# Patient Record
Sex: FEMALE | Race: WHITE | NOT HISPANIC OR LATINO
[De-identification: names, ages, dates, MRNs, and addresses within clinical notes are randomized per-mention and may not be internally consistent; named-entity substitution may affect disease eponyms.]

---

## 2017-12-08 ENCOUNTER — RESULT REVIEW (OUTPATIENT)
Age: 67
End: 2017-12-08

## 2018-04-23 PROBLEM — Z00.00 ENCOUNTER FOR PREVENTIVE HEALTH EXAMINATION: Status: ACTIVE | Noted: 2018-04-23

## 2018-08-08 ENCOUNTER — APPOINTMENT (OUTPATIENT)
Dept: OTOLARYNGOLOGY | Facility: CLINIC | Age: 68
End: 2018-08-08
Payer: MEDICARE

## 2018-08-08 VITALS
DIASTOLIC BLOOD PRESSURE: 79 MMHG | SYSTOLIC BLOOD PRESSURE: 121 MMHG | WEIGHT: 145 LBS | BODY MASS INDEX: 26.68 KG/M2 | HEIGHT: 62 IN

## 2018-08-08 DIAGNOSIS — M19.90 UNSPECIFIED OSTEOARTHRITIS, UNSPECIFIED SITE: ICD-10-CM

## 2018-08-08 DIAGNOSIS — I10 ESSENTIAL (PRIMARY) HYPERTENSION: ICD-10-CM

## 2018-08-08 DIAGNOSIS — Z82.2 FAMILY HISTORY OF DEAFNESS AND HEARING LOSS: ICD-10-CM

## 2018-08-08 DIAGNOSIS — H91.93 UNSPECIFIED HEARING LOSS, BILATERAL: ICD-10-CM

## 2018-08-08 DIAGNOSIS — Z87.891 PERSONAL HISTORY OF NICOTINE DEPENDENCE: ICD-10-CM

## 2018-08-08 PROCEDURE — 92567 TYMPANOMETRY: CPT

## 2018-08-08 PROCEDURE — 92557 COMPREHENSIVE HEARING TEST: CPT

## 2018-08-08 PROCEDURE — 99203 OFFICE O/P NEW LOW 30 MIN: CPT

## 2018-08-08 RX ORDER — IRBESARTAN 300 MG/1
TABLET ORAL
Refills: 0 | Status: ACTIVE | COMMUNITY

## 2018-08-28 ENCOUNTER — APPOINTMENT (OUTPATIENT)
Dept: OTOLARYNGOLOGY | Facility: CLINIC | Age: 68
End: 2018-08-28
Payer: MEDICARE

## 2018-08-28 PROCEDURE — 92567 TYMPANOMETRY: CPT

## 2018-08-28 PROCEDURE — 92585: CPT

## 2018-09-06 ENCOUNTER — MESSAGE (OUTPATIENT)
Age: 68
End: 2018-09-06

## 2018-10-16 ENCOUNTER — APPOINTMENT (OUTPATIENT)
Dept: PHARMACY | Facility: CLINIC | Age: 68
End: 2018-10-16
Payer: SELF-PAY

## 2018-10-16 PROCEDURE — V5010 ASSESSMENT FOR HEARING AID: CPT | Mod: NC

## 2018-11-06 ENCOUNTER — OUTPATIENT (OUTPATIENT)
Dept: OUTPATIENT SERVICES | Facility: HOSPITAL | Age: 68
LOS: 1 days | End: 2018-11-06

## 2018-11-06 ENCOUNTER — APPOINTMENT (OUTPATIENT)
Dept: PHARMACY | Facility: CLINIC | Age: 68
End: 2018-11-06

## 2018-11-08 DIAGNOSIS — H90.3 SENSORINEURAL HEARING LOSS, BILATERAL: ICD-10-CM

## 2018-11-28 ENCOUNTER — APPOINTMENT (OUTPATIENT)
Dept: PHARMACY | Facility: CLINIC | Age: 68
End: 2018-11-28
Payer: SELF-PAY

## 2018-11-28 PROCEDURE — V5299A: CUSTOM | Mod: NC

## 2019-03-05 ENCOUNTER — APPOINTMENT (OUTPATIENT)
Dept: PHARMACY | Facility: CLINIC | Age: 69
End: 2019-03-05
Payer: SELF-PAY

## 2019-03-05 PROCEDURE — V5299A: CUSTOM | Mod: NC

## 2019-06-05 ENCOUNTER — APPOINTMENT (OUTPATIENT)
Dept: PHARMACY | Facility: CLINIC | Age: 69
End: 2019-06-05
Payer: SELF-PAY

## 2019-06-05 PROCEDURE — V5265A: CUSTOM

## 2019-06-05 PROCEDURE — V5267D: CUSTOM

## 2019-08-07 ENCOUNTER — APPOINTMENT (OUTPATIENT)
Dept: OTOLARYNGOLOGY | Facility: CLINIC | Age: 69
End: 2019-08-07
Payer: MEDICARE

## 2019-08-07 VITALS — BODY MASS INDEX: 27.05 KG/M2 | WEIGHT: 147 LBS | HEIGHT: 62 IN

## 2019-08-07 DIAGNOSIS — H61.23 IMPACTED CERUMEN, BILATERAL: ICD-10-CM

## 2019-08-07 PROCEDURE — 69210 REMOVE IMPACTED EAR WAX UNI: CPT

## 2019-08-07 PROCEDURE — 99213 OFFICE O/P EST LOW 20 MIN: CPT | Mod: 25

## 2019-08-07 PROCEDURE — 92557 COMPREHENSIVE HEARING TEST: CPT

## 2019-08-07 PROCEDURE — 92567 TYMPANOMETRY: CPT

## 2019-08-07 RX ORDER — PSYLLIUM HUSK 0.4 G
CAPSULE ORAL
Refills: 0 | Status: DISCONTINUED | COMMUNITY
End: 2019-08-07

## 2019-08-09 PROBLEM — H61.23 BILATERAL IMPACTED CERUMEN: Status: ACTIVE | Noted: 2019-08-09

## 2019-08-09 NOTE — HISTORY OF PRESENT ILLNESS
[de-identified] : 69F here for f/u asymmetric left SNHL. Pt feels hearing is stable- has b/l hearing hearing aids- wears them consistently. Pt denies otalgia, otorrhea, ear infections, tinnitus, dizziness, vertigo or headaches related to hearing.

## 2019-08-09 NOTE — PHYSICAL EXAM
[Midline] : trachea located in midline position [Normal] : no rashes [de-identified] : wax AS removed under microscope - EAC upslopes

## 2019-08-09 NOTE — REASON FOR VISIT
[Subsequent Evaluation] : a subsequent evaluation for [FreeTextEntry2] : f/u for asymmetric left SNHL

## 2019-10-21 ENCOUNTER — APPOINTMENT (OUTPATIENT)
Dept: PHARMACY | Facility: CLINIC | Age: 69
End: 2019-10-21
Payer: SELF-PAY

## 2019-10-21 PROCEDURE — V5299A: CUSTOM | Mod: NC

## 2019-11-27 ENCOUNTER — APPOINTMENT (OUTPATIENT)
Dept: PHARMACY | Facility: CLINIC | Age: 69
End: 2019-11-27
Payer: SELF-PAY

## 2019-11-27 PROCEDURE — V5299A: CUSTOM | Mod: NC

## 2020-09-23 ENCOUNTER — RESULT REVIEW (OUTPATIENT)
Age: 70
End: 2020-09-23

## 2020-09-24 ENCOUNTER — APPOINTMENT (OUTPATIENT)
Dept: PHARMACY | Facility: CLINIC | Age: 70
End: 2020-09-24
Payer: SELF-PAY

## 2020-09-24 PROCEDURE — V5266C: CUSTOM

## 2020-11-30 ENCOUNTER — APPOINTMENT (OUTPATIENT)
Dept: OTOLARYNGOLOGY | Facility: CLINIC | Age: 70
End: 2020-11-30
Payer: MEDICARE

## 2020-11-30 ENCOUNTER — APPOINTMENT (OUTPATIENT)
Dept: PHARMACY | Facility: CLINIC | Age: 70
End: 2020-11-30
Payer: SELF-PAY

## 2020-11-30 VITALS
DIASTOLIC BLOOD PRESSURE: 83 MMHG | BODY MASS INDEX: 27.6 KG/M2 | SYSTOLIC BLOOD PRESSURE: 137 MMHG | WEIGHT: 150 LBS | HEART RATE: 73 BPM | HEIGHT: 62 IN

## 2020-11-30 PROCEDURE — V5265A: CUSTOM

## 2020-11-30 PROCEDURE — 99213 OFFICE O/P EST LOW 20 MIN: CPT

## 2020-11-30 PROCEDURE — V5267D: CUSTOM

## 2020-11-30 RX ORDER — DILTIAZEM HYDROCHLORIDE 120 MG/1
120 CAPSULE, EXTENDED RELEASE ORAL
Qty: 30 | Refills: 0 | Status: DISCONTINUED | COMMUNITY
Start: 2020-08-12

## 2020-11-30 RX ORDER — ELUXADOLINE 75 MG/1
TABLET, FILM COATED ORAL
Refills: 0 | Status: DISCONTINUED | COMMUNITY
End: 2020-11-30

## 2020-11-30 RX ORDER — MUPIROCIN 20 MG/G
2 OINTMENT TOPICAL
Qty: 22 | Refills: 0 | Status: DISCONTINUED | COMMUNITY
Start: 2020-10-26

## 2020-11-30 RX ORDER — DOXYCYCLINE HYCLATE 100 MG/1
100 CAPSULE ORAL
Qty: 2 | Refills: 0 | Status: DISCONTINUED | COMMUNITY
Start: 2020-10-26

## 2020-12-01 NOTE — PHYSICAL EXAM
[Midline] : trachea located in midline position [Normal] : no rashes [de-identified] : tm NORMAL au- EAC upslopes

## 2020-12-01 NOTE — REASON FOR VISIT
[Subsequent Evaluation] : a subsequent evaluation for [FreeTextEntry2] : follow up bilateral hearing loss, left worse than the right

## 2020-12-01 NOTE — DATA REVIEWED
[de-identified] : Mild sensorineural hearing loss with the exception of hearing within normal limits 250Hz-1kHz, AD, and 250Hz-500Hz, AS.

## 2020-12-01 NOTE — HISTORY OF PRESENT ILLNESS
[de-identified] : 70 year old female follow up bilateral hearing loss, left worse than the right.  States has bilateral hearing aids but hasn't been wearing them for months due to the mask, when taking off the mask, the hearing aids came out.   States feels no change in hearing since last visit Aug, 2019. No tinnitus or vertigo -

## 2021-02-11 ENCOUNTER — APPOINTMENT (OUTPATIENT)
Dept: PHARMACY | Facility: CLINIC | Age: 71
End: 2021-02-11
Payer: SELF-PAY

## 2021-02-11 PROCEDURE — V5299A: CUSTOM | Mod: NC

## 2021-09-27 ENCOUNTER — APPOINTMENT (OUTPATIENT)
Dept: PHARMACY | Facility: CLINIC | Age: 71
End: 2021-09-27
Payer: SELF-PAY

## 2021-09-27 PROCEDURE — V5299A: CUSTOM | Mod: NC

## 2021-11-29 ENCOUNTER — APPOINTMENT (OUTPATIENT)
Dept: OTOLARYNGOLOGY | Facility: CLINIC | Age: 71
End: 2021-11-29

## 2022-01-19 ENCOUNTER — APPOINTMENT (OUTPATIENT)
Dept: OTOLARYNGOLOGY | Facility: CLINIC | Age: 72
End: 2022-01-19
Payer: MEDICARE

## 2022-01-19 VITALS — BODY MASS INDEX: 27.23 KG/M2 | WEIGHT: 148 LBS | HEIGHT: 62 IN

## 2022-01-19 VITALS — DIASTOLIC BLOOD PRESSURE: 85 MMHG | SYSTOLIC BLOOD PRESSURE: 155 MMHG | HEART RATE: 75 BPM

## 2022-01-19 DIAGNOSIS — H90.5 UNSPECIFIED SENSORINEURAL HEARING LOSS: ICD-10-CM

## 2022-01-19 PROCEDURE — 92567 TYMPANOMETRY: CPT

## 2022-01-19 PROCEDURE — 92557 COMPREHENSIVE HEARING TEST: CPT

## 2022-01-19 PROCEDURE — 99213 OFFICE O/P EST LOW 20 MIN: CPT

## 2022-02-05 PROBLEM — H90.5 ASYMMETRICAL SENSORINEURAL HEARING LOSS: Status: ACTIVE | Noted: 2022-02-05

## 2022-02-05 NOTE — PHYSICAL EXAM
[Midline] : trachea located in midline position [Normal] : no rashes [de-identified] : tm NORMAL au- EAC upslopes

## 2022-02-05 NOTE — DATA REVIEWED
[de-identified] : Right ear: Hearing is WNL from 250Hz-1kHz with an essentially mild SNHL thereafter\par Left ear: Essentially mild to moderate SNHL from 250Hz-8kHz\par Type A tymps Au

## 2022-02-05 NOTE — HISTORY OF PRESENT ILLNESS
[de-identified] : 71 year old female follow up bilateral hearing loss, left worse than the right.  has bilateral hearing aids but hasn't been wearing them due to the mask, when taking off the mask, the hearing aids came out.  feels no change in hearing since last visit Nov 30, 2020.  has bilateral tinnitus, if she thinks about it, it's there, if she doesn't think about it, she doesn't notice it.

## 2022-03-22 ENCOUNTER — RESULT REVIEW (OUTPATIENT)
Age: 72
End: 2022-03-22

## 2022-08-25 ENCOUNTER — APPOINTMENT (OUTPATIENT)
Dept: PHARMACY | Facility: CLINIC | Age: 72
End: 2022-08-25

## 2022-08-25 PROCEDURE — V5014I: CUSTOM

## 2023-01-25 ENCOUNTER — APPOINTMENT (OUTPATIENT)
Dept: OTOLARYNGOLOGY | Facility: CLINIC | Age: 73
End: 2023-01-25

## 2023-03-29 ENCOUNTER — APPOINTMENT (OUTPATIENT)
Dept: OTOLARYNGOLOGY | Facility: CLINIC | Age: 73
End: 2023-03-29

## 2023-05-27 ENCOUNTER — INPATIENT (INPATIENT)
Facility: HOSPITAL | Age: 73
LOS: 4 days | Discharge: HOME | End: 2023-06-01
Attending: STUDENT IN AN ORGANIZED HEALTH CARE EDUCATION/TRAINING PROGRAM | Admitting: STUDENT IN AN ORGANIZED HEALTH CARE EDUCATION/TRAINING PROGRAM
Payer: MEDICARE

## 2023-05-27 VITALS
OXYGEN SATURATION: 99 % | DIASTOLIC BLOOD PRESSURE: 82 MMHG | SYSTOLIC BLOOD PRESSURE: 126 MMHG | TEMPERATURE: 97 F | HEART RATE: 110 BPM | RESPIRATION RATE: 21 BRPM

## 2023-05-27 PROCEDURE — 99285 EMERGENCY DEPT VISIT HI MDM: CPT | Mod: GC

## 2023-05-27 RX ORDER — FOLIC ACID 0.8 MG
1 TABLET ORAL ONCE
Refills: 0 | Status: COMPLETED | OUTPATIENT
Start: 2023-05-27 | End: 2023-05-27

## 2023-05-27 RX ORDER — THIAMINE MONONITRATE (VIT B1) 100 MG
500 TABLET ORAL ONCE
Refills: 0 | Status: COMPLETED | OUTPATIENT
Start: 2023-05-27 | End: 2023-05-27

## 2023-05-27 RX ORDER — SODIUM CHLORIDE 9 MG/ML
1000 INJECTION INTRAMUSCULAR; INTRAVENOUS; SUBCUTANEOUS ONCE
Refills: 0 | Status: COMPLETED | OUTPATIENT
Start: 2023-05-27 | End: 2023-05-27

## 2023-05-27 NOTE — ED ADULT TRIAGE NOTE - CHIEF COMPLAINT QUOTE
worsening unsteady gait x 2 weeks with 2 falls today and increased anxiety related to inability to safely ambulate. PT denies any LOC or head trauma during falls and falls have been unwitnessed. PT also reports numbness to left foot. Hx of anxiety, HTN,

## 2023-05-28 DIAGNOSIS — E87.1 HYPO-OSMOLALITY AND HYPONATREMIA: ICD-10-CM

## 2023-05-28 DIAGNOSIS — R74.01 ELEVATION OF LEVELS OF LIVER TRANSAMINASE LEVELS: ICD-10-CM
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
## 2023-05-28 DIAGNOSIS — F10.239 ALCOHOL DEPENDENCE WITH WITHDRAWAL, UNSPECIFIED: ICD-10-CM

## 2023-05-28 DIAGNOSIS — Z29.9 ENCOUNTER FOR PROPHYLACTIC MEASURES, UNSPECIFIED: ICD-10-CM

## 2023-05-28 DIAGNOSIS — I10 ESSENTIAL (PRIMARY) HYPERTENSION: ICD-10-CM

## 2023-05-28 DIAGNOSIS — R26.81 UNSTEADINESS ON FEET: ICD-10-CM

## 2023-05-28 DIAGNOSIS — F41.9 ANXIETY DISORDER, UNSPECIFIED: ICD-10-CM

## 2023-05-28 DIAGNOSIS — D64.9 ANEMIA, UNSPECIFIED: ICD-10-CM

## 2023-05-28 LAB
ALBUMIN SERPL ELPH-MCNC: 3.9 G/DL — SIGNIFICANT CHANGE UP (ref 3.3–5)
ALBUMIN SERPL ELPH-MCNC: 4 G/DL — SIGNIFICANT CHANGE UP (ref 3.3–5)
ALBUMIN SERPL ELPH-MCNC: 4.5 G/DL — SIGNIFICANT CHANGE UP (ref 3.3–5)
ALP SERPL-CCNC: 142 U/L — HIGH (ref 40–120)
ALP SERPL-CCNC: 147 U/L — HIGH (ref 40–120)
ALP SERPL-CCNC: 167 U/L — HIGH (ref 40–120)
ALT FLD-CCNC: 117 U/L — HIGH (ref 4–33)
ALT FLD-CCNC: 97 U/L — HIGH (ref 4–33)
ALT FLD-CCNC: 97 U/L — HIGH (ref 4–33)
ANION GAP SERPL CALC-SCNC: 13 MMOL/L — SIGNIFICANT CHANGE UP (ref 7–14)
ANION GAP SERPL CALC-SCNC: 15 MMOL/L — HIGH (ref 7–14)
ANION GAP SERPL CALC-SCNC: 15 MMOL/L — HIGH (ref 7–14)
ANION GAP SERPL CALC-SCNC: 17 MMOL/L — HIGH (ref 7–14)
ANION GAP SERPL CALC-SCNC: 20 MMOL/L — HIGH (ref 7–14)
APPEARANCE UR: CLEAR — SIGNIFICANT CHANGE UP
APTT BLD: 26.8 SEC — LOW (ref 27–36.3)
AST SERPL-CCNC: 116 U/L — HIGH (ref 4–32)
AST SERPL-CCNC: 118 U/L — HIGH (ref 4–32)
AST SERPL-CCNC: 147 U/L — HIGH (ref 4–32)
BACTERIA # UR AUTO: ABNORMAL
BASE EXCESS BLDV CALC-SCNC: 1.6 MMOL/L — SIGNIFICANT CHANGE UP (ref -2–3)
BASOPHILS # BLD AUTO: 0.04 K/UL — SIGNIFICANT CHANGE UP (ref 0–0.2)
BASOPHILS NFR BLD AUTO: 0.6 % — SIGNIFICANT CHANGE UP (ref 0–2)
BILIRUB DIRECT SERPL-MCNC: 0.8 MG/DL — HIGH (ref 0–0.3)
BILIRUB DIRECT SERPL-MCNC: 0.8 MG/DL — HIGH (ref 0–0.3)
BILIRUB INDIRECT FLD-MCNC: 1 MG/DL — SIGNIFICANT CHANGE UP (ref 0–1)
BILIRUB INDIRECT FLD-MCNC: 1 MG/DL — SIGNIFICANT CHANGE UP (ref 0–1)
BILIRUB SERPL-MCNC: 1.8 MG/DL — HIGH (ref 0.2–1.2)
BILIRUB SERPL-MCNC: 1.8 MG/DL — HIGH (ref 0.2–1.2)
BILIRUB SERPL-MCNC: 2.5 MG/DL — HIGH (ref 0.2–1.2)
BILIRUB UR-MCNC: NEGATIVE — SIGNIFICANT CHANGE UP
BUN SERPL-MCNC: 10 MG/DL — SIGNIFICANT CHANGE UP (ref 7–23)
BUN SERPL-MCNC: 7 MG/DL — SIGNIFICANT CHANGE UP (ref 7–23)
BUN SERPL-MCNC: 7 MG/DL — SIGNIFICANT CHANGE UP (ref 7–23)
BUN SERPL-MCNC: 8 MG/DL — SIGNIFICANT CHANGE UP (ref 7–23)
BUN SERPL-MCNC: 8 MG/DL — SIGNIFICANT CHANGE UP (ref 7–23)
CA-I SERPL-SCNC: 1.16 MMOL/L — SIGNIFICANT CHANGE UP (ref 1.15–1.33)
CALCIUM SERPL-MCNC: 8.3 MG/DL — LOW (ref 8.4–10.5)
CALCIUM SERPL-MCNC: 8.4 MG/DL — SIGNIFICANT CHANGE UP (ref 8.4–10.5)
CALCIUM SERPL-MCNC: 8.8 MG/DL — SIGNIFICANT CHANGE UP (ref 8.4–10.5)
CALCIUM SERPL-MCNC: 8.8 MG/DL — SIGNIFICANT CHANGE UP (ref 8.4–10.5)
CALCIUM SERPL-MCNC: 9.3 MG/DL — SIGNIFICANT CHANGE UP (ref 8.4–10.5)
CHLORIDE BLDV-SCNC: 87 MMOL/L — LOW (ref 96–108)
CHLORIDE SERPL-SCNC: 81 MMOL/L — LOW (ref 98–107)
CHLORIDE SERPL-SCNC: 85 MMOL/L — LOW (ref 98–107)
CHLORIDE SERPL-SCNC: 85 MMOL/L — LOW (ref 98–107)
CHLORIDE SERPL-SCNC: 86 MMOL/L — LOW (ref 98–107)
CHLORIDE SERPL-SCNC: 87 MMOL/L — LOW (ref 98–107)
CO2 BLDV-SCNC: 27 MMOL/L — HIGH (ref 22–26)
CO2 SERPL-SCNC: 21 MMOL/L — LOW (ref 22–31)
CO2 SERPL-SCNC: 22 MMOL/L — SIGNIFICANT CHANGE UP (ref 22–31)
COLOR SPEC: SIGNIFICANT CHANGE UP
CREAT SERPL-MCNC: 0.49 MG/DL — LOW (ref 0.5–1.3)
CREAT SERPL-MCNC: 0.51 MG/DL — SIGNIFICANT CHANGE UP (ref 0.5–1.3)
CREAT SERPL-MCNC: 0.52 MG/DL — SIGNIFICANT CHANGE UP (ref 0.5–1.3)
CREAT SERPL-MCNC: 0.55 MG/DL — SIGNIFICANT CHANGE UP (ref 0.5–1.3)
CREAT SERPL-MCNC: 0.59 MG/DL — SIGNIFICANT CHANGE UP (ref 0.5–1.3)
CRP SERPL-MCNC: 4.8 MG/L — SIGNIFICANT CHANGE UP
DIFF PNL FLD: NEGATIVE — SIGNIFICANT CHANGE UP
EGFR: 100 ML/MIN/1.73M2 — SIGNIFICANT CHANGE UP
EGFR: 100 ML/MIN/1.73M2 — SIGNIFICANT CHANGE UP
EGFR: 101 ML/MIN/1.73M2 — SIGNIFICANT CHANGE UP
EGFR: 97 ML/MIN/1.73M2 — SIGNIFICANT CHANGE UP
EGFR: 99 ML/MIN/1.73M2 — SIGNIFICANT CHANGE UP
EOSINOPHIL # BLD AUTO: 0.02 K/UL — SIGNIFICANT CHANGE UP (ref 0–0.5)
EOSINOPHIL NFR BLD AUTO: 0.3 % — SIGNIFICANT CHANGE UP (ref 0–6)
EPI CELLS # UR: 1 /HPF — SIGNIFICANT CHANGE UP (ref 0–5)
FERRITIN SERPL-MCNC: 1249 NG/ML — HIGH (ref 15–150)
FOLATE SERPL-MCNC: >20 NG/ML — SIGNIFICANT CHANGE UP (ref 3.1–17.5)
GAS PNL BLDV: 121 MMOL/L — LOW (ref 136–145)
GAS PNL BLDV: SIGNIFICANT CHANGE UP
GLUCOSE BLDV-MCNC: 104 MG/DL — HIGH (ref 70–99)
GLUCOSE SERPL-MCNC: 104 MG/DL — HIGH (ref 70–99)
GLUCOSE SERPL-MCNC: 106 MG/DL — HIGH (ref 70–99)
GLUCOSE SERPL-MCNC: 111 MG/DL — HIGH (ref 70–99)
GLUCOSE SERPL-MCNC: 137 MG/DL — HIGH (ref 70–99)
GLUCOSE SERPL-MCNC: 95 MG/DL — SIGNIFICANT CHANGE UP (ref 70–99)
GLUCOSE UR QL: NEGATIVE — SIGNIFICANT CHANGE UP
HCO3 BLDV-SCNC: 26 MMOL/L — SIGNIFICANT CHANGE UP (ref 22–29)
HCT VFR BLD CALC: 29.5 % — LOW (ref 34.5–45)
HCT VFR BLD CALC: 31.1 % — LOW (ref 34.5–45)
HCT VFR BLDA CALC: 32 % — LOW (ref 34.5–46.5)
HGB BLD CALC-MCNC: 10.8 G/DL — LOW (ref 11.7–16.1)
HGB BLD-MCNC: 10.4 G/DL — LOW (ref 11.5–15.5)
HGB BLD-MCNC: 10.9 G/DL — LOW (ref 11.5–15.5)
IANC: 4.58 K/UL — SIGNIFICANT CHANGE UP (ref 1.8–7.4)
IMM GRANULOCYTES NFR BLD AUTO: 0.6 % — SIGNIFICANT CHANGE UP (ref 0–0.9)
INR BLD: 1.07 RATIO — SIGNIFICANT CHANGE UP (ref 0.88–1.16)
IRON SATN MFR SERPL: 108 UG/DL — SIGNIFICANT CHANGE UP (ref 30–160)
IRON SATN MFR SERPL: 50 % — SIGNIFICANT CHANGE UP (ref 14–50)
KETONES UR-MCNC: ABNORMAL
LACTATE BLDV-MCNC: 0.9 MMOL/L — SIGNIFICANT CHANGE UP (ref 0.5–2)
LEUKOCYTE ESTERASE UR-ACNC: ABNORMAL
LYMPHOCYTES # BLD AUTO: 0.8 K/UL — LOW (ref 1–3.3)
LYMPHOCYTES # BLD AUTO: 12.9 % — LOW (ref 13–44)
MAGNESIUM SERPL-MCNC: 1.2 MG/DL — LOW (ref 1.6–2.6)
MAGNESIUM SERPL-MCNC: 1.2 MG/DL — LOW (ref 1.6–2.6)
MAGNESIUM SERPL-MCNC: 2 MG/DL — SIGNIFICANT CHANGE UP (ref 1.6–2.6)
MCHC RBC-ENTMCNC: 33.3 PG — SIGNIFICANT CHANGE UP (ref 27–34)
MCHC RBC-ENTMCNC: 34.8 PG — HIGH (ref 27–34)
MCHC RBC-ENTMCNC: 35 GM/DL — SIGNIFICANT CHANGE UP (ref 32–36)
MCHC RBC-ENTMCNC: 35.3 GM/DL — SIGNIFICANT CHANGE UP (ref 32–36)
MCV RBC AUTO: 95.1 FL — SIGNIFICANT CHANGE UP (ref 80–100)
MCV RBC AUTO: 98.7 FL — SIGNIFICANT CHANGE UP (ref 80–100)
MONOCYTES # BLD AUTO: 0.7 K/UL — SIGNIFICANT CHANGE UP (ref 0–0.9)
MONOCYTES NFR BLD AUTO: 11.3 % — SIGNIFICANT CHANGE UP (ref 2–14)
NEUTROPHILS # BLD AUTO: 4.58 K/UL — SIGNIFICANT CHANGE UP (ref 1.8–7.4)
NEUTROPHILS NFR BLD AUTO: 74.3 % — SIGNIFICANT CHANGE UP (ref 43–77)
NITRITE UR-MCNC: NEGATIVE — SIGNIFICANT CHANGE UP
NRBC # BLD: 0 /100 WBCS — SIGNIFICANT CHANGE UP (ref 0–0)
NRBC # BLD: 0 /100 WBCS — SIGNIFICANT CHANGE UP (ref 0–0)
NRBC # FLD: 0 K/UL — SIGNIFICANT CHANGE UP (ref 0–0)
NRBC # FLD: 0 K/UL — SIGNIFICANT CHANGE UP (ref 0–0)
OSMOLALITY SERPL: 258 MOSM/KG — LOW (ref 275–295)
OSMOLALITY UR: 228 MOSM/KG — SIGNIFICANT CHANGE UP (ref 50–1200)
PCO2 BLDV: 38 MMHG — LOW (ref 39–52)
PH BLDV: 7.44 — HIGH (ref 7.32–7.43)
PH UR: 6.5 — SIGNIFICANT CHANGE UP (ref 5–8)
PHOSPHATE SERPL-MCNC: 2.7 MG/DL — SIGNIFICANT CHANGE UP (ref 2.5–4.5)
PHOSPHATE SERPL-MCNC: 2.8 MG/DL — SIGNIFICANT CHANGE UP (ref 2.5–4.5)
PHOSPHATE SERPL-MCNC: 2.9 MG/DL — SIGNIFICANT CHANGE UP (ref 2.5–4.5)
PLATELET # BLD AUTO: 104 K/UL — LOW (ref 150–400)
PLATELET # BLD AUTO: 117 K/UL — LOW (ref 150–400)
PO2 BLDV: 37 MMHG — SIGNIFICANT CHANGE UP (ref 25–45)
POTASSIUM BLDV-SCNC: 3.1 MMOL/L — LOW (ref 3.5–5.1)
POTASSIUM SERPL-MCNC: 3.1 MMOL/L — LOW (ref 3.5–5.3)
POTASSIUM SERPL-MCNC: 3.2 MMOL/L — LOW (ref 3.5–5.3)
POTASSIUM SERPL-MCNC: 3.3 MMOL/L — LOW (ref 3.5–5.3)
POTASSIUM SERPL-MCNC: 3.4 MMOL/L — LOW (ref 3.5–5.3)
POTASSIUM SERPL-MCNC: 3.5 MMOL/L — SIGNIFICANT CHANGE UP (ref 3.5–5.3)
POTASSIUM SERPL-SCNC: 3.1 MMOL/L — LOW (ref 3.5–5.3)
POTASSIUM SERPL-SCNC: 3.2 MMOL/L — LOW (ref 3.5–5.3)
POTASSIUM SERPL-SCNC: 3.3 MMOL/L — LOW (ref 3.5–5.3)
POTASSIUM SERPL-SCNC: 3.4 MMOL/L — LOW (ref 3.5–5.3)
POTASSIUM SERPL-SCNC: 3.5 MMOL/L — SIGNIFICANT CHANGE UP (ref 3.5–5.3)
PROCALCITONIN SERPL-MCNC: 0.25 NG/ML — HIGH (ref 0.02–0.1)
PROT SERPL-MCNC: 6.2 G/DL — SIGNIFICANT CHANGE UP (ref 6–8.3)
PROT SERPL-MCNC: 6.5 G/DL — SIGNIFICANT CHANGE UP (ref 6–8.3)
PROT SERPL-MCNC: 7.1 G/DL — SIGNIFICANT CHANGE UP (ref 6–8.3)
PROT UR-MCNC: ABNORMAL
PROTHROM AB SERPL-ACNC: 12.4 SEC — SIGNIFICANT CHANGE UP (ref 10.5–13.4)
RBC # BLD: 2.99 M/UL — LOW (ref 3.8–5.2)
RBC # BLD: 3.27 M/UL — LOW (ref 3.8–5.2)
RBC # FLD: 13 % — SIGNIFICANT CHANGE UP (ref 10.3–14.5)
RBC # FLD: 13.1 % — SIGNIFICANT CHANGE UP (ref 10.3–14.5)
RBC CASTS # UR COMP ASSIST: 1 /HPF — SIGNIFICANT CHANGE UP (ref 0–4)
SAO2 % BLDV: 65.3 % — LOW (ref 67–88)
SODIUM SERPL-SCNC: 122 MMOL/L — LOW (ref 135–145)
SODIUM SERPL-SCNC: 125 MMOL/L — LOW (ref 135–145)
SP GR SPEC: 1.01 — LOW (ref 1.01–1.05)
T4 AB SER-ACNC: 7.99 UG/DL — SIGNIFICANT CHANGE UP (ref 5.1–13)
TIBC SERPL-MCNC: 218 UG/DL — LOW (ref 220–430)
TSH SERPL-MCNC: 2.5 UIU/ML — SIGNIFICANT CHANGE UP (ref 0.27–4.2)
TSH SERPL-MCNC: 2.66 UIU/ML — SIGNIFICANT CHANGE UP (ref 0.27–4.2)
UIBC SERPL-MCNC: 110 UG/DL — SIGNIFICANT CHANGE UP (ref 110–370)
UROBILINOGEN FLD QL: SIGNIFICANT CHANGE UP
VIT B12 SERPL-MCNC: 1049 PG/ML — HIGH (ref 200–900)
WBC # BLD: 4.03 K/UL — SIGNIFICANT CHANGE UP (ref 3.8–10.5)
WBC # BLD: 6.18 K/UL — SIGNIFICANT CHANGE UP (ref 3.8–10.5)
WBC # FLD AUTO: 4.03 K/UL — SIGNIFICANT CHANGE UP (ref 3.8–10.5)
WBC # FLD AUTO: 6.18 K/UL — SIGNIFICANT CHANGE UP (ref 3.8–10.5)
WBC UR QL: 15 /HPF — HIGH (ref 0–5)

## 2023-05-28 PROCEDURE — 12345: CPT | Mod: NC,GC

## 2023-05-28 PROCEDURE — 76705 ECHO EXAM OF ABDOMEN: CPT | Mod: 26

## 2023-05-28 PROCEDURE — 99497 ADVNCD CARE PLAN 30 MIN: CPT | Mod: 25

## 2023-05-28 PROCEDURE — 99223 1ST HOSP IP/OBS HIGH 75: CPT

## 2023-05-28 PROCEDURE — 73564 X-RAY EXAM KNEE 4 OR MORE: CPT | Mod: 26,LT

## 2023-05-28 PROCEDURE — 71045 X-RAY EXAM CHEST 1 VIEW: CPT | Mod: 26

## 2023-05-28 PROCEDURE — 70450 CT HEAD/BRAIN W/O DYE: CPT | Mod: 26,MA

## 2023-05-28 RX ORDER — MAGNESIUM SULFATE 500 MG/ML
2 VIAL (ML) INJECTION EVERY 4 HOURS
Refills: 0 | Status: COMPLETED | OUTPATIENT
Start: 2023-05-28 | End: 2023-05-28

## 2023-05-28 RX ORDER — ACETAMINOPHEN 500 MG
650 TABLET ORAL ONCE
Refills: 0 | Status: COMPLETED | OUTPATIENT
Start: 2023-05-28 | End: 2023-05-28

## 2023-05-28 RX ORDER — POTASSIUM CHLORIDE 20 MEQ
40 PACKET (EA) ORAL EVERY 4 HOURS
Refills: 0 | Status: DISCONTINUED | OUTPATIENT
Start: 2023-05-28 | End: 2023-05-28

## 2023-05-28 RX ORDER — THIAMINE MONONITRATE (VIT B1) 100 MG
500 TABLET ORAL EVERY 8 HOURS
Refills: 0 | Status: DISCONTINUED | OUTPATIENT
Start: 2023-05-28 | End: 2023-06-01

## 2023-05-28 RX ORDER — POTASSIUM CHLORIDE 20 MEQ
10 PACKET (EA) ORAL
Refills: 0 | Status: COMPLETED | OUTPATIENT
Start: 2023-05-28 | End: 2023-05-28

## 2023-05-28 RX ORDER — ENOXAPARIN SODIUM 100 MG/ML
40 INJECTION SUBCUTANEOUS EVERY 24 HOURS
Refills: 0 | Status: DISCONTINUED | OUTPATIENT
Start: 2023-05-28 | End: 2023-06-01

## 2023-05-28 RX ORDER — LOSARTAN POTASSIUM 100 MG/1
50 TABLET, FILM COATED ORAL DAILY
Refills: 0 | Status: DISCONTINUED | OUTPATIENT
Start: 2023-05-28 | End: 2023-06-01

## 2023-05-28 RX ORDER — MAGNESIUM SULFATE 500 MG/ML
2 VIAL (ML) INJECTION ONCE
Refills: 0 | Status: COMPLETED | OUTPATIENT
Start: 2023-05-28 | End: 2023-05-28

## 2023-05-28 RX ORDER — SODIUM CHLORIDE 9 MG/ML
1000 INJECTION INTRAMUSCULAR; INTRAVENOUS; SUBCUTANEOUS
Refills: 0 | Status: DISCONTINUED | OUTPATIENT
Start: 2023-05-28 | End: 2023-05-28

## 2023-05-28 RX ORDER — MAGNESIUM SULFATE 500 MG/ML
1 VIAL (ML) INJECTION ONCE
Refills: 0 | Status: DISCONTINUED | OUTPATIENT
Start: 2023-05-28 | End: 2023-05-28

## 2023-05-28 RX ORDER — SODIUM CHLORIDE 9 MG/ML
1000 INJECTION INTRAMUSCULAR; INTRAVENOUS; SUBCUTANEOUS
Refills: 0 | Status: DISCONTINUED | OUTPATIENT
Start: 2023-05-28 | End: 2023-05-29

## 2023-05-28 RX ORDER — FOLIC ACID 0.8 MG
1 TABLET ORAL DAILY
Refills: 0 | Status: DISCONTINUED | OUTPATIENT
Start: 2023-05-28 | End: 2023-05-31

## 2023-05-28 RX ADMIN — SODIUM CHLORIDE 75 MILLILITER(S): 9 INJECTION INTRAMUSCULAR; INTRAVENOUS; SUBCUTANEOUS at 20:22

## 2023-05-28 RX ADMIN — Medication 105 MILLIGRAM(S): at 08:43

## 2023-05-28 RX ADMIN — Medication 105 MILLIGRAM(S): at 16:39

## 2023-05-28 RX ADMIN — Medication 1 MILLIGRAM(S): at 00:40

## 2023-05-28 RX ADMIN — SODIUM CHLORIDE 100 MILLILITER(S): 9 INJECTION INTRAMUSCULAR; INTRAVENOUS; SUBCUTANEOUS at 22:33

## 2023-05-28 RX ADMIN — Medication 105 MILLIGRAM(S): at 22:34

## 2023-05-28 RX ADMIN — Medication 1 MILLIGRAM(S): at 11:57

## 2023-05-28 RX ADMIN — Medication 105 MILLIGRAM(S): at 00:40

## 2023-05-28 RX ADMIN — Medication 40 MILLIEQUIVALENT(S): at 10:22

## 2023-05-28 RX ADMIN — Medication 25 GRAM(S): at 10:24

## 2023-05-28 RX ADMIN — Medication 25 GRAM(S): at 04:00

## 2023-05-28 RX ADMIN — SODIUM CHLORIDE 1000 MILLILITER(S): 9 INJECTION INTRAMUSCULAR; INTRAVENOUS; SUBCUTANEOUS at 00:39

## 2023-05-28 RX ADMIN — Medication 25 GRAM(S): at 07:17

## 2023-05-28 RX ADMIN — Medication 2 MILLIGRAM(S): at 00:40

## 2023-05-28 RX ADMIN — Medication 1 TABLET(S): at 11:57

## 2023-05-28 RX ADMIN — LOSARTAN POTASSIUM 50 MILLIGRAM(S): 100 TABLET, FILM COATED ORAL at 07:16

## 2023-05-28 RX ADMIN — Medication 100 MILLIEQUIVALENT(S): at 14:26

## 2023-05-28 RX ADMIN — Medication 25 MILLIGRAM(S): at 00:41

## 2023-05-28 RX ADMIN — Medication 100 MILLIEQUIVALENT(S): at 13:18

## 2023-05-28 RX ADMIN — Medication 650 MILLIGRAM(S): at 04:09

## 2023-05-28 RX ADMIN — Medication 100 MILLIEQUIVALENT(S): at 15:14

## 2023-05-28 RX ADMIN — ENOXAPARIN SODIUM 40 MILLIGRAM(S): 100 INJECTION SUBCUTANEOUS at 08:48

## 2023-05-28 RX ADMIN — Medication 1 TABLET(S): at 00:40

## 2023-05-28 RX ADMIN — SODIUM CHLORIDE 50 MILLILITER(S): 9 INJECTION INTRAMUSCULAR; INTRAVENOUS; SUBCUTANEOUS at 10:23

## 2023-05-28 NOTE — ED PROVIDER NOTE - ATTENDING CONTRIBUTION TO CARE
72F p/w worsening unsteady gait with 2x falls today, c/o anxiety.  PMHX HTN anxiety, ETOH substance use disorder.  Sx x 1 month worsening over past 2 days, now using cane for the first time.  Son also reports some memory los and tremors of hands.  H/o ETOH use but never stopped to the point of withdrawal - no hospital admission.  Pt reports last drink earlier today.  No vomiting or LOC, no CP/SOB or abd pain.  Pt appears mildly intoxicated and undergoing early withdrawal.  Possibly wernicke's encephalopathy, rx high dose thiamine, benzos, fluids, check labs  and CT.  Low suspicion for stroke given ETOH dependence and findings consistent with that.  Admit for alcohol withdrawal, consideration of rehab or detox.  Also c/o L knee pain, check xray.  VS:  unremarkable except tachycardia     GEN - NAD;  malaise, anxious, tremulous   A+O x3   HEAD - NC/AT     ENT - PEERL, EOMI, mucous membranes  dry no discharge      NECK: Neck supple, non-tender without lymphadenopathy, no masses, no JVD  PULM - CTA b/l,  symmetric breath sounds  COR -  normal heart sounds    ABD - , ND, NT, soft,  BACK - no CVA tenderness, nontender spine     EXTREMS - no edema, no deformity, warm and well perfused  (+)L knee mild ttp no deformity.  Distal NVT intact.   SKIN - no rash   (+)scattered bruising    No lacerations.   NEUROLOGIC - alert, face symmetric, speech fluent, sensation nl, motor no focal deficit.

## 2023-05-28 NOTE — H&P ADULT - CONVERSATION DETAILS
I spoke extensively to the patient at bedside regarding overall prognosis due to advanced age and comorbidities. MOLST form also discussed and I explained to the patient what DNR/DNI, chest compressions and ventilators are, as well as the contents of the MOLST form.  Patient is Full Code, Full medical interventions, Allow feeding tubes, Trial of IVF and use of antibiotics. She states that patient is quite strong when she is not sick and would want to continue living. All questions answered to the best of satisfaction.

## 2023-05-28 NOTE — H&P ADULT - PROBLEM SELECTOR PLAN 4
Alk phos 167, AST//117, Tbili 2.5 on presentation. Likely related to EtOH liver disease.   -RUQ sono  -CTM Patient without history of alcohol withdrawal. Daily drinker 3+ drinks for several decades.   -S/p IV ativan 2mg and IV librium 50mg in ED   -Avoid librium iso transaminitis   -Sx triggered CIWA  -thiamine, folate, nutrition c/s Pt with several falls over past month related to unstable gait. Denies head trauma or LOC.   -Likely related to alcohol intoxication vs nutritional deficiency.   -PT consult as below

## 2023-05-28 NOTE — PHYSICAL THERAPY INITIAL EVALUATION ADULT - PERTINENT HX OF CURRENT PROBLEM, REHAB EVAL
72F former smoker with PMH of hypertension, anxiety, and EtOH dependence, presents to ED with son complaining of frequent falls and gait instability.  Patient reports has had difficulty ambulating over the last month which has worsened in the last couple of days resulting in multiple falls.  Patient now newly ambulating with cane.

## 2023-05-28 NOTE — PROGRESS NOTE ADULT - PROBLEM SELECTOR PLAN 3
Na 122 on presentation. Likely beer potomania vs tea&toast diet.   -S/p 1L NS bolus in ED   -Repeat Na 125  -Urine studies   -Monitor I/Os   -Nutrition consult, perhaps would benefit from increase protein in diet Na 122 on presentation. Likely beer potomania vs tea&toast diet.   -S/p 1L NS bolus in ED, continue NS 50ccs/hour x10h  -BMP q6h  -Urine lytes  -Monitor I/Os   -Nutrition consult, perhaps would benefit from increase protein in diet

## 2023-05-28 NOTE — ED ADULT NURSE NOTE - OBJECTIVE STATEMENT
Patient is A&OX4, awake and alert. pt coming to ED in regards to two falls and unsteady gait for two weeks. Pt family states the patient has been having unsteady gait for about 6 months now with worsening over the last two weeks. Pt denies anticoagulant use or LOC Pt states she has three shots of vodka each day, Pt son states she drinks more than that but unsure how much. Pt denies SOB, chest pain, Headache, weakness, no facial droop noted. PMH of Anxiety. lung sounds clear BL, respirations are even and unlabored. heart tones audible and regular. will continue to monitor.

## 2023-05-28 NOTE — ED ADULT NURSE NOTE - NSFALLRISKINTERV_ED_ALL_ED
Assistance OOB with selected safe patient handling equipment if applicable/Assistance with ambulation/Communicate fall risk and risk factors to all staff, patient, and family/Monitor gait and stability/Provide visual cue: yellow wristband, yellow gown, etc/Reinforce activity limits and safety measures with patient and family/Call bell, personal items and telephone in reach/Instruct patient to call for assistance before getting out of bed/chair/stretcher/Non-slip footwear applied when patient is off stretcher/[place] to call system/Physically safe environment - no spills, clutter or unnecessary equipment/Purposeful Proactive Rounding/Room/bathroom lighting operational, light cord in reach

## 2023-05-28 NOTE — H&P ADULT - ATTENDING COMMENTS
I agree with the above H&P from ACP/Resident/Intern. I have personally seen and examined the patient.  I fully participated in the care of this patient.  I have made amendments to the documentation where necessary, and agree with the history, physical exam, and plan as documented by the Resident.  In addition:   HPI: 72F former smoker with PMH of hypertension, anxiety, and EtOH dependence, presents to ED with son complaining of frequent falls and gait instability.  Patient reports has had difficulty ambulating over the last month which has worsened in the last couple of days resulting in multiple falls.  Patient now newly ambulating with cane.  Patient states having extreme anxiety when ambulating, d/t fear of falling.  Per son patient has also had significant short-term memory loss over the last few weeks as well as tremors.  Patient also complaining of left foot paresthesias and Lt knee pain.  Patient reports has been daily drinker for years and has never stopped long enough to go through withdrawal. Denies history of seizures, intubation, hospitalization for withdrawal.  Patient's last drink was earlier this afternoon.  Additionally, patient notes 10lb weight loss over the past few weeks in setting of decreased appetite. Patient also denies fever/chills, cough, congestion, chest pain, shortness of breath, abdominal pain, nausea/vomiting/diarrhea, urinary symptoms, dizziness, weakness or any other symptoms at this time. She said she was suppose to have an MRI done outpatient in a few weeks.    In the ED, patient was afebrile, HR , -156, RR 21 (satting 99% on RA). Labs notable for H/H 10.9/31.1, platelets 117, Na 122, Bicarb 21, Mag 1.2, Alk phos 167, AST//117, Tbili 2.5. XR L knee wnl. CTH with no acute changes. S/p 1L NS bolus. S/p IV ativan 2mg x 1, IV librium 50mg x 1 in ED.     Labs: Reviewed  Imaging: Reviewed  EKG: Reviewed    PHYSICAL EXAM:  GENERAL: NAD, comfortable appearing  CHEST/LUNG: Clear to auscultation bilaterally; No wheezes, rales or rhonchi  HEART: Regular rate and rhythm; No murmurs, rubs, or gallops, (+)S1, S2  ABDOMEN: Soft, Nontender, Nondistended; Normal Bowel sounds   EXTREMITIES:  2+ Peripheral Pulses, No clubbing, cyanosis, or edema  Neuro: Mild dysmetria on the LUE vs intention tremor    A/P: She is admitted for hyponatremia and gait instability. She is currently not withdrawing. Her hyponatremia is from beer potomania likely. Will recheck Na and give some IVF and correct no more than 8 within 24 hours. Will need neuro consult for concern for underlying intracranial abnormalities such as stroke for gait imbalance.

## 2023-05-28 NOTE — ED PROVIDER NOTE - PROGRESS NOTE DETAILS
Kiki Randhawa PGY3: CTH/CXR negative. CIWA 1 currently after ativan/librium. Plan to admit medicine for further observation.

## 2023-05-28 NOTE — PATIENT PROFILE ADULT - FALL HARM RISK - HARM RISK INTERVENTIONS

## 2023-05-28 NOTE — PROGRESS NOTE ADULT - PROBLEM SELECTOR PLAN 5
Alk phos 167, AST//117, Tbili 2.5 on presentation. Likely related to EtOH liver disease.   -RUQ sono  -CTM Alk phos 167, AST//117, Tbili 2.5 on presentation. Likely related to EtOH liver disease.   -RUQ sono with hepatic steatosis  -CTM

## 2023-05-28 NOTE — PROGRESS NOTE ADULT - SUBJECTIVE AND OBJECTIVE BOX
Patient is a 72y old  Female who presents with a chief complaint of Fall (28 May 2023 05:37)      SUBJECTIVE / OVERNIGHT EVENTS:    MEDICATIONS  (STANDING):  enoxaparin Injectable 40 milliGRAM(s) SubCutaneous every 24 hours  folic acid 1 milliGRAM(s) Oral daily  losartan 50 milliGRAM(s) Oral daily  magnesium sulfate  IVPB 2 Gram(s) IV Intermittent every 4 hours  multivitamin 1 Tablet(s) Oral daily  thiamine IVPB 500 milliGRAM(s) IV Intermittent every 8 hours    MEDICATIONS  (PRN):  LORazepam     Tablet 1 milliGRAM(s) Oral every 2 hours PRN CIWA-Ar score increase by 2 points and a total score of 7 or less  LORazepam   Injectable 1 milliGRAM(s) IV Push every 1 hour PRN CIWA-Ar score 8 or greater      CAPILLARY BLOOD GLUCOSE        I&O's Summary      Vital Signs Last 24 Hrs  T(C): 36.7 (28 May 2023 07:09), Max: 36.9 (28 May 2023 03:15)  T(F): 98 (28 May 2023 07:09), Max: 98.5 (28 May 2023 03:15)  HR: 75 (28 May 2023 07:09) (75 - 110)  BP: 132/87 (28 May 2023 07:09) (126/82 - 157/95)  BP(mean): --  RR: 17 (28 May 2023 07:09) (17 - 21)  SpO2: 100% (28 May 2023 07:09) (98% - 100%)    Parameters below as of 28 May 2023 07:09  Patient On (Oxygen Delivery Method): room air        PHYSICAL EXAM:  GENERAL: NAD, well-developed  HEAD: Atraumatic, Normocephalic  EYES: EOMI, PERRLA, conjunctiva and sclera clear  NECK: Supple, No JVD  CHEST/LUNG: Clear to auscultation bilaterally; No wheezes or crackles  HEART: Normal S1/S2; Regular rate and rhythm; No murmurs, rubs, or gallops  ABDOMEN: Soft, Nontender, Nondistended; Bowel sounds present  EXTREMITIES: 2+ Peripheral Pulses; No clubbing, cyanosis, or edema  PSYCH: A&Ox3  NEUROLOGY: no focal neurologic deficit  SKIN: No rashes or lesions    LABS:                        10.9   6.18  )-----------( 117      ( 28 May 2023 00:30 )             31.1          125<L>  |  86<L>  |  8   ----------------------------<  95  3.3<L>   |  22  |  0.52    Ca    8.4      28 May 2023 04:00  Phos  2.9       Mg     1.20         TPro  6.2  /  Alb  3.9  /  TBili  1.8<H>  /  DBili  0.8<H>  /  AST  118<H>  /  ALT  97<H>  /  AlkPhos  142<H>      PT/INR - ( 28 May 2023 04:00 )   PT: 12.4 sec;   INR: 1.07 ratio         PTT - ( 28 May 2023 04:00 )  PTT:26.8 sec      Urinalysis Basic - ( 28 May 2023 06:10 )    Color: Light Yellow / Appearance: Clear / S.009 / pH: x  Gluc: x / Ketone: Small  / Bili: Negative / Urobili: <2 mg/dL   Blood: x / Protein: Trace / Nitrite: Negative   Leuk Esterase: Large / RBC: 1 /HPF / WBC 15 /HPF   Sq Epi: x / Non Sq Epi: x / Bacteria: Many        RADIOLOGY & ADDITIONAL TESTS:    Imaging Personally Reviewed:    Consultant(s) Notes Reviewed:      Care Discussed with Consultants/Other Providers:   Patient is a 72y old  Female who presents with a chief complaint of Fall (28 May 2023 05:37)      SUBJECTIVE / OVERNIGHT EVENTS:    Patient very sleepy this am    MEDICATIONS  (STANDING):  enoxaparin Injectable 40 milliGRAM(s) SubCutaneous every 24 hours  folic acid 1 milliGRAM(s) Oral daily  losartan 50 milliGRAM(s) Oral daily  magnesium sulfate  IVPB 2 Gram(s) IV Intermittent every 4 hours  multivitamin 1 Tablet(s) Oral daily  thiamine IVPB 500 milliGRAM(s) IV Intermittent every 8 hours    MEDICATIONS  (PRN):  LORazepam     Tablet 1 milliGRAM(s) Oral every 2 hours PRN CIWA-Ar score increase by 2 points and a total score of 7 or less  LORazepam   Injectable 1 milliGRAM(s) IV Push every 1 hour PRN CIWA-Ar score 8 or greater      CAPILLARY BLOOD GLUCOSE        I&O's Summary      Vital Signs Last 24 Hrs  T(C): 36.7 (28 May 2023 07:09), Max: 36.9 (28 May 2023 03:15)  T(F): 98 (28 May 2023 07:09), Max: 98.5 (28 May 2023 03:15)  HR: 75 (28 May 2023 07:09) (75 - 110)  BP: 132/87 (28 May 2023 07:09) (126/82 - 157/95)  BP(mean): --  RR: 17 (28 May 2023 07:09) (17 - 21)  SpO2: 100% (28 May 2023 07:09) (98% - 100%)    Parameters below as of 28 May 2023 07:09  Patient On (Oxygen Delivery Method): room air        PHYSICAL EXAM:  GENERAL: NAD, well-developed  HEAD: Atraumatic, Normocephalic  EYES: EOMI, PERRLA, conjunctiva and sclera clear  NECK: Supple, No JVD  CHEST/LUNG: Clear to auscultation bilaterally; No wheezes or crackles  HEART: Normal S1/S2; Regular rate and rhythm; No murmurs, rubs, or gallops  ABDOMEN: Soft, Nontender, Nondistended; Bowel sounds present  EXTREMITIES: 2+ Peripheral Pulses; No clubbing, cyanosis, or edema  PSYCH: A&Ox3  NEUROLOGY: no focal neurologic deficit  SKIN: No rashes or lesions    LABS:                        10.9   6.18  )-----------( 117      ( 28 May 2023 00:30 )             31.1          125<L>  |  86<L>  |  8   ----------------------------<  95  3.3<L>   |  22  |  0.52    Ca    8.4      28 May 2023 04:00  Phos  2.9       Mg     1.20         TPro  6.2  /  Alb  3.9  /  TBili  1.8<H>  /  DBili  0.8<H>  /  AST  118<H>  /  ALT  97<H>  /  AlkPhos  142<H>      PT/INR - ( 28 May 2023 04:00 )   PT: 12.4 sec;   INR: 1.07 ratio         PTT - ( 28 May 2023 04:00 )  PTT:26.8 sec      Urinalysis Basic - ( 28 May 2023 06:10 )    Color: Light Yellow / Appearance: Clear / S.009 / pH: x  Gluc: x / Ketone: Small  / Bili: Negative / Urobili: <2 mg/dL   Blood: x / Protein: Trace / Nitrite: Negative   Leuk Esterase: Large / RBC: 1 /HPF / WBC 15 /HPF   Sq Epi: x / Non Sq Epi: x / Bacteria: Many        RADIOLOGY & ADDITIONAL TESTS:    Imaging Personally Reviewed:    Consultant(s) Notes Reviewed:      Care Discussed with Consultants/Other Providers:   Patient is a 72y old  Female who presents with a chief complaint of Fall (28 May 2023 05:37)      SUBJECTIVE / OVERNIGHT EVENTS:    Patient very sleepy this am, complaining of anxiety around falls.  Denies changes in vision or pain.    MEDICATIONS  (STANDING):  enoxaparin Injectable 40 milliGRAM(s) SubCutaneous every 24 hours  folic acid 1 milliGRAM(s) Oral daily  losartan 50 milliGRAM(s) Oral daily  magnesium sulfate  IVPB 2 Gram(s) IV Intermittent every 4 hours  multivitamin 1 Tablet(s) Oral daily  thiamine IVPB 500 milliGRAM(s) IV Intermittent every 8 hours    MEDICATIONS  (PRN):  LORazepam     Tablet 1 milliGRAM(s) Oral every 2 hours PRN CIWA-Ar score increase by 2 points and a total score of 7 or less  LORazepam   Injectable 1 milliGRAM(s) IV Push every 1 hour PRN CIWA-Ar score 8 or greater      CAPILLARY BLOOD GLUCOSE        I&O's Summary      Vital Signs Last 24 Hrs  T(C): 36.7 (28 May 2023 07:09), Max: 36.9 (28 May 2023 03:15)  T(F): 98 (28 May 2023 07:09), Max: 98.5 (28 May 2023 03:15)  HR: 75 (28 May 2023 07:09) (75 - 110)  BP: 132/87 (28 May 2023 07:09) (126/82 - 157/95)  BP(mean): --  RR: 17 (28 May 2023 07:09) (17 - 21)  SpO2: 100% (28 May 2023 07:09) (98% - 100%)    Parameters below as of 28 May 2023 07:09  Patient On (Oxygen Delivery Method): room air        PHYSICAL EXAM:  GENERAL: sleepy, intermittently falling asleep during conversation  HEAD: Atraumatic, Normocephalic  EYES: EOMI, PERRLA, conjunctiva and sclera clear, +several beats of nystagmus on looking up+right  NECK: Supple, No JVD  CHEST/LUNG: Clear to auscultation bilaterally; No wheezes or crackles  HEART: Normal S1/S2; Regular rate and rhythm; No murmurs, rubs, or gallops  ABDOMEN: Soft, Nontender, Nondistended; Bowel sounds present  EXTREMITIES: 2+ Peripheral Pulses; No clubbing, cyanosis, or edema, no asterixis  PSYCH: A&Ox3  NEUROLOGY: symmetric facial muscles, 4/5-5/5 b/l strength in UE and LE, normal sensation to pinprick and light touch in b/l LE, midline tongue, somewhat impaired finger-to-nose  SKIN: No rashes or lesions    LABS:                        10.9   6.18  )-----------( 117      ( 28 May 2023 00:30 )             31.1          125<L>  |  86<L>  |  8   ----------------------------<  95  3.3<L>   |  22  |  0.52    Ca    8.4      28 May 2023 04:00  Phos  2.9       Mg     1.20         TPro  6.2  /  Alb  3.9  /  TBili  1.8<H>  /  DBili  0.8<H>  /  AST  118<H>  /  ALT  97<H>  /  AlkPhos  142<H>      PT/INR - ( 28 May 2023 04:00 )   PT: 12.4 sec;   INR: 1.07 ratio         PTT - ( 28 May 2023 04:00 )  PTT:26.8 sec      Urinalysis Basic - ( 28 May 2023 06:10 )    Color: Light Yellow / Appearance: Clear / S.009 / pH: x  Gluc: x / Ketone: Small  / Bili: Negative / Urobili: <2 mg/dL   Blood: x / Protein: Trace / Nitrite: Negative   Leuk Esterase: Large / RBC: 1 /HPF / WBC 15 /HPF   Sq Epi: x / Non Sq Epi: x / Bacteria: Many        RADIOLOGY & ADDITIONAL TESTS:    Imaging Personally Reviewed:    Consultant(s) Notes Reviewed:      Care Discussed with Consultants/Other Providers:

## 2023-05-28 NOTE — H&P ADULT - ASSESSMENT
72F former smoker with PMH of hypertension, anxiety, and EtOH dependence, presents to ED with son complaining of frequent falls and gait instability.  Patient reports has had difficulty ambulating over the last month which has worsened in the last couple of days resulting in multiple falls. A/w concern for alcohol withdrawal, hyponatremia, and w/u of gait instability.

## 2023-05-28 NOTE — ED PROVIDER NOTE - CARE PLAN
Principal Discharge DX:	Unsteady gait   1 Principal Discharge DX:	Unsteady gait  Secondary Diagnosis:	Alcohol dependence with withdrawal

## 2023-05-28 NOTE — PROGRESS NOTE ADULT - PROBLEM SELECTOR PLAN 6
Hgb 10.9 on presentation (unclear baseline). No s/s of bleeding.   -Iron studies in AM   -b12, folate   -CTM Hgb 10.9 on presentation (unclear baseline). No s/s of bleeding.   -Iron studies with elevated ferritin, normal iron, low TIBC, ?AOCD  -b12, folate   -CTM

## 2023-05-28 NOTE — H&P ADULT - NSHPREVIEWOFSYSTEMS_GEN_ALL_CORE
Review of Systems:     Constitutional: No fever, chills +weakness +weight loss +falls    Eyes: No blindness, no eye pain    ENT: No throat pain, no rhinorrhea     Neck: No pain or stiffness     Respiratory: No cough, no shortness of breath     Cardiovascular: No chest pain, no palpitations     Gastrointestinal: No abdominal or epigastric pain. No nausea, vomiting, or diarrhea     Genitourinary: No dysuria, no change in frequency, no hematuria     Neurological: +LLE numbness/tingling intermittently +confusion     Skin: No itching, burning, rashes, or lesions     Psych: No depression or anxiety

## 2023-05-28 NOTE — ED PROVIDER NOTE - CLINICAL SUMMARY MEDICAL DECISION MAKING FREE TEXT BOX
72-year-old female, past medical history of hypertension, anxiety, and EtOH dependence, presents to ED with son complaining of frequent falls and gait instability. Pt also w/ memory impairments, tremors, and Rt foot paresthesias.    Pt tachycardic, otherwise VSS. Physical/Neuro exam mostly unremarkable, except for gait instability and tremors.    DDx includes Wernicke-Korsakoff vs. ETOH withdrawal vs. Brain bleed/CVA  Plan to check basic labs, UA, CXR, and CTH. Will give IVF, thiamine, multivitamin, folic acid, ativan and librium for relief. Likely will require admission.

## 2023-05-28 NOTE — H&P ADULT - PROBLEM SELECTOR PLAN 2
Pt with several falls over past month related to unstable gait. Denies head trauma or LOC.   -Likely related to alcohol intoxication vs nutritional deficiency.   -PT consult as below Patient without history of alcohol withdrawal. Daily drinker 3+ drinks for several decades.   -S/p IV ativan 2mg and IV librium 50mg in ED   -Avoid librium iso transaminitis   -Sx triggered CIWA  -thiamine, folate, nutrition c/s

## 2023-05-28 NOTE — PROGRESS NOTE ADULT - PROBLEM SELECTOR PLAN 4
Patient without history of alcohol withdrawal. Daily drinker 3+ drinks for several decades.   -S/p IV ativan 2mg and IV librium 50mg in ED   -Avoid librium iso transaminitis   -Sx triggered CIWA  -thiamine, folate, nutrition c/s

## 2023-05-28 NOTE — PROGRESS NOTE ADULT - PROBLEM SELECTOR PLAN 9
-DVT ppx: lovenox   -Diet: DASH with 1200mL fluid restriction   -Dispo: TBD. PT consulted. -DVT ppx: lovenox   -Diet: DASH/TLC  -Dispo: TBD. PT consulted.

## 2023-05-28 NOTE — ED PROVIDER NOTE - NS ED ROS FT
Gen: Denies fever, weight loss  HEENT: Denies vision changes, ear pain, epistaxis, sore throat  CV: Denies chest pain, palpitations  Skin: Denies rash, erythema, color changes  Resp: Denies SOB, cough  Endo: Denies sensitivity to heat, cold, increased urination  GI: Denies abdominal pain, constipation, nausea, vomiting, diarrhea  Msk: Denies back pain, LE swelling, extremity pain; +knee pain  : Denies dysuria, increased frequency  Neuro: Denies LOC, weakness; +numbness, +tingling, +tremors, +gait instability  Psych: Denies hx of psych, hallucinations  ROS statement: all other ROS negative except as per HPI

## 2023-05-28 NOTE — H&P ADULT - PROBLEM SELECTOR PLAN 3
Patient without history of alcohol withdrawal. Daily drinker 3+ drinks for several decades.   -S/p IV ativan 2mg and IV librium 50mg in ED   -Avoid librium iso transaminitis   -Sx triggered CIWA  -thiamine, folate, nutrition c/s Na 122 on presentation. Likely beer potomania vs tea&toast diet.   -S/p 1L NS bolus in ED   -Repeat Na 125  -Urine studies   -Monitor I/Os   -Nutrition consult, perhaps would benefit from increase protein in diet Pt reports gait instability over past few months with several assocated falls. Likely alcohol-related nutritional deficiency vs cerebellar CVA.   -B12 level in AM   -thiamine supplementation   -CT wnl   -Fall risk protocol   -PT consult  -Consider Neuro consult in AM, patient scheduled for MR head as outpatient

## 2023-05-28 NOTE — ED PROVIDER NOTE - PHYSICAL EXAMINATION
PHYSICAL EXAM:  GENERAL: anxious appearing; in no respiratory distress  HEENT: Atraumatic, Normocephalic, PERRL, EOMs intact b/l w/out deficits, no conjunctival pallor, MMM  NECK: No JVD; FROM  CHEST/LUNG: CTAB no wheezes/rhonchi/rales  HEART: tachycardia, no murmur/gallops/rubs  ABDOMEN: +BS, soft, NT, ND  EXTREMITIES: No LE edema, +2 radial pulses b/l, +2 DP/PT pulses b/l  MUSCULOSKELETAL: FROM of all 4 extremities  NERVOUS SYSTEM:  A&Ox3, No motor deficits or sensory deficits; CNII-XII intact; no nystagmus; nml finger-to-nose; unsteady gait; neg pronator drift; unable to assess rhomberg (d/t anxiety); very tremulous  SKIN:  No new rashes

## 2023-05-28 NOTE — PROGRESS NOTE ADULT - PROBLEM SELECTOR PLAN 2
Pt with several falls over past month related to unstable gait. Denies head trauma or LOC.   -Likely related to alcohol intoxication vs nutritional deficiency.   -PT consult as below Pt with several falls over past month related to unstable gait. Denies head trauma or LOC.   -Likely related to alcohol intoxication vs nutritional deficiency vs. hyponatremia  -PT consult as below

## 2023-05-28 NOTE — H&P ADULT - NSHPPHYSICALEXAM_GEN_ALL_CORE
Vital Signs Last 24 Hrs  T(C): 36.9 (28 May 2023 03:15), Max: 36.9 (28 May 2023 03:15)  T(F): 98.5 (28 May 2023 03:15), Max: 98.5 (28 May 2023 03:15)  HR: 91 (28 May 2023 03:15) (91 - 110)  BP: 157/95 (28 May 2023 03:15) (126/82 - 157/95)  BP(mean): --  RR: 19 (28 May 2023 03:15) (19 - 21)  SpO2: 100% (28 May 2023 03:15) (99% - 100%)    Parameters below as of 28 May 2023 03:15  Patient On (Oxygen Delivery Method): room air    Physical Exam:     General: No acute distress, well-appearing    Eyes: PERRL, EOMI     ENT: dry oral mucosa     Pulm: No increased WOB. CTAB. No wheezing.     CV: RRR. S1&S2+. No M/R/G appreciated.     Abdomen: +BS. Soft, NTND. No organomegaly.     MSK: Nml ROM    Extremities: No peripheral edema or cyanosis.     Neuro: A&Ox2, decreased sensation to touch on LLE 5/5 strength BLE     Skin: Warm and dry. No visible rash.

## 2023-05-28 NOTE — PHYSICAL THERAPY INITIAL EVALUATION ADULT - LEVEL OF INDEPENDENCE: GAIT, REHAB EVAL
slight unsteadiness on feet.  (Pt reports her falls are due to anxiety rather than balance/strength)/contact guard

## 2023-05-28 NOTE — H&P ADULT - HISTORY OF PRESENT ILLNESS
72F former smoker with PMH of hypertension, anxiety, and EtOH dependence, presents to ED with son complaining of frequent falls and gait instability.  Patient reports has had difficulty ambulating over the last month which has worsened in the last couple of days resulting in multiple falls.  Patient now newly ambulating with cane.  Patient states having extreme anxiety when ambulating, d/t fear of falling.  Per son patient has also had significant short-term memory loss over the last few weeks as well as tremors.  Patient also complaining of left foot paresthesias and Lt knee pain.  Patient reports has been daily drinker for years and has never stopped long enough to go through withdrawal. Denies history of seizures, intubation, hospitalization for withdrawal.  Patient's last drink was earlier this afternoon.  Additionally, patient notes 10lb weight loss over the past few weeks in setting of decreased appetite. Patient also denies fever/chills, cough, congestion, chest pain, shortness of breath, abdominal pain, nausea/vomiting/diarrhea, urinary symptoms, dizziness, weakness or any other symptoms at this time.    In the ED, patient was afebrile, HR , -156, RR 21 (satting 99% on RA). Labs notable for H/H 10.9/31.1, platelets 117, Na 122, Bicarb 21, Mag 1.2, Alk phos 167, AST//117, Tbili 2.5. XR L knee wnl. CTH with no acute changes. S/p 1L NS bolus. S/p IV ativan 2mg x 1, IV librium 50mg x 1 in ED.

## 2023-05-28 NOTE — H&P ADULT - PROBLEM SELECTOR PLAN 1
Na 122 on presentation. Likely SIADH from dec po intake over past few days vs tea&toast diet.   -S/p 1L NS bolus in ED   -Repeat Na 125  -Fluid restriction and monitor   -Urine studies   -Monitor I/Os   -Nutrition consult, perhaps would benefit from increase protein in diet Pt reports gait instability over past few months with several assocated falls. Likely alcohol-related nutritional deficiency vs cerebellar CVA.   -B12 level in AM   -thiamine supplementation   -CT wnl   -Fall risk protocol   -PT consult  -Consider Neuro consult in AM, patient scheduled for MR head as outpatient Na 122 on presentation. Likely beer potomania vs tea&toast diet.   -S/p 1L NS bolus in ED   -Repeat Na 125  -Urine studies   -Monitor I/Os   -Nutrition consult, perhaps would benefit from increase protein in diet

## 2023-05-28 NOTE — PROGRESS NOTE ADULT - PROBLEM SELECTOR PLAN 1
Pt reports gait instability over past few months with several assocated falls. Likely alcohol-related nutritional deficiency vs cerebellar CVA.   -B12 level in AM   -thiamine supplementation   -CT wnl   -Fall risk protocol   -PT consult  -Consider Neuro consult in AM, patient scheduled for MR head as outpatient Pt reports gait instability over past few months with several assocated falls. Likely alcohol-related nutritional deficiency vs cerebellar CVA vs. electrolyte abnormality  -B12 wnl  -thiamine supplementation 500 tid, followed by 250 qd x3d  -CTH wnl   -Fall risk protocol   -PT consult  -MRI head

## 2023-05-28 NOTE — PROGRESS NOTE ADULT - ASSESSMENT
72F former smoker with PMH of hypertension, anxiety, and EtOH dependence, presents to ED with son complaining of frequent falls and gait instability.  Patient reports has had difficulty ambulating over the last month which has worsened in the last couple of days resulting in multiple falls. A/w concern for alcohol withdrawal, hyponatremia, and w/u of gait instability.  72F former smoker with PMH of hypertension, anxiety, and EtOH dependence, presents to ED with son complaining of frequent falls and gait instability.  Patient reports has had difficulty ambulating over the last month which has worsened in the last couple of days resulting in multiple falls. A/w concern for alcohol withdrawal, hyponatremia, and w/u of gait instability.

## 2023-05-28 NOTE — H&P ADULT - PROBLEM SELECTOR PLAN 5
Hgb 10.9 on presentation (unclear baseline). No s/s of bleeding.   -Iron studies in AM   -CTM Hgb 10.9 on presentation (unclear baseline). No s/s of bleeding.   -Iron studies in AM   -b12, folate   -CTM Alk phos 167, AST//117, Tbili 2.5 on presentation. Likely related to EtOH liver disease.   -RUQ sono  -CTM

## 2023-05-28 NOTE — H&P ADULT - PROBLEM SELECTOR PLAN 6
Pt reports gait instability over past few months with several assocated falls. Likely alcohol-related nutritional deficiency.   -B12 level in AM   -thiamine supplementation   -CT wnl   -Fall risk protocol   -PT consult Hgb 10.9 on presentation (unclear baseline). No s/s of bleeding.   -Iron studies in AM   -b12, folate   -CTM

## 2023-05-28 NOTE — PHYSICAL THERAPY INITIAL EVALUATION ADULT - ADDITIONAL COMMENTS
Pt reports she lives with her , uses 2 walking sticks to ambulate, multiple falls, few steps to negotiate, completes ADLs without assistance.

## 2023-05-28 NOTE — ED PROVIDER NOTE - OBJECTIVE STATEMENT
72-year-old female, past medical history of hypertension, anxiety, and EtOH dependence, presents to ED with son complaining of frequent falls and gait instability.  Patient reports has had difficulty ambulating over the last month which has worsened in the last couple of days resulting in multiple falls.  Patient now newly ambulating with cane.  Patient states having extreme anxiety when ambulating, d/t fear of falling.  Per son patient has also had significant short-term memory loss over the last few weeks as well as tremors.  Patient also complaining of left foot paresthesias and Lt knee pain.  Patient reports has been daily drinker for years and has never stopped long enough to go through withdrawal.  Patient's last drink was earlier this afternoon.  Patient denies smoking history or other drug use.  Patient also denies fever/chills, cough, congestion, chest pain, shortness of breath, abdominal pain, nausea/vomiting/diarrhea, urinary symptoms, dizziness, weakness or any other symptoms at this time.

## 2023-05-29 LAB
ALBUMIN SERPL ELPH-MCNC: 3.7 G/DL — SIGNIFICANT CHANGE UP (ref 3.3–5)
ALP SERPL-CCNC: 139 U/L — HIGH (ref 40–120)
ALT FLD-CCNC: 85 U/L — HIGH (ref 4–33)
ANION GAP SERPL CALC-SCNC: 11 MMOL/L — SIGNIFICANT CHANGE UP (ref 7–14)
ANION GAP SERPL CALC-SCNC: 12 MMOL/L — SIGNIFICANT CHANGE UP (ref 7–14)
ANION GAP SERPL CALC-SCNC: 12 MMOL/L — SIGNIFICANT CHANGE UP (ref 7–14)
ANION GAP SERPL CALC-SCNC: 14 MMOL/L — SIGNIFICANT CHANGE UP (ref 7–14)
AST SERPL-CCNC: 106 U/L — HIGH (ref 4–32)
BILIRUB SERPL-MCNC: 1.1 MG/DL — SIGNIFICANT CHANGE UP (ref 0.2–1.2)
BUN SERPL-MCNC: 4 MG/DL — LOW (ref 7–23)
BUN SERPL-MCNC: 4 MG/DL — LOW (ref 7–23)
BUN SERPL-MCNC: 5 MG/DL — LOW (ref 7–23)
BUN SERPL-MCNC: 5 MG/DL — LOW (ref 7–23)
CALCIUM SERPL-MCNC: 8.2 MG/DL — LOW (ref 8.4–10.5)
CALCIUM SERPL-MCNC: 8.4 MG/DL — SIGNIFICANT CHANGE UP (ref 8.4–10.5)
CALCIUM SERPL-MCNC: 8.4 MG/DL — SIGNIFICANT CHANGE UP (ref 8.4–10.5)
CALCIUM SERPL-MCNC: 8.6 MG/DL — SIGNIFICANT CHANGE UP (ref 8.4–10.5)
CHLORIDE SERPL-SCNC: 91 MMOL/L — LOW (ref 98–107)
CHLORIDE SERPL-SCNC: 92 MMOL/L — LOW (ref 98–107)
CHLORIDE SERPL-SCNC: 92 MMOL/L — LOW (ref 98–107)
CHLORIDE SERPL-SCNC: 96 MMOL/L — LOW (ref 98–107)
CO2 SERPL-SCNC: 18 MMOL/L — LOW (ref 22–31)
CO2 SERPL-SCNC: 21 MMOL/L — LOW (ref 22–31)
CO2 SERPL-SCNC: 23 MMOL/L — SIGNIFICANT CHANGE UP (ref 22–31)
CO2 SERPL-SCNC: 23 MMOL/L — SIGNIFICANT CHANGE UP (ref 22–31)
CREAT SERPL-MCNC: 0.5 MG/DL — SIGNIFICANT CHANGE UP (ref 0.5–1.3)
CREAT SERPL-MCNC: 0.51 MG/DL — SIGNIFICANT CHANGE UP (ref 0.5–1.3)
CREAT SERPL-MCNC: 0.52 MG/DL — SIGNIFICANT CHANGE UP (ref 0.5–1.3)
CREAT SERPL-MCNC: 0.59 MG/DL — SIGNIFICANT CHANGE UP (ref 0.5–1.3)
EGFR: 100 ML/MIN/1.73M2 — SIGNIFICANT CHANGE UP
EGFR: 100 ML/MIN/1.73M2 — SIGNIFICANT CHANGE UP
EGFR: 101 ML/MIN/1.73M2 — SIGNIFICANT CHANGE UP
EGFR: 97 ML/MIN/1.73M2 — SIGNIFICANT CHANGE UP
ETHANOL SERPL-MCNC: <10 MG/DL — SIGNIFICANT CHANGE UP
GI PCR PANEL: SIGNIFICANT CHANGE UP
GIANT PLATELETS BLD QL SMEAR: PRESENT — SIGNIFICANT CHANGE UP
GLUCOSE SERPL-MCNC: 113 MG/DL — HIGH (ref 70–99)
GLUCOSE SERPL-MCNC: 121 MG/DL — HIGH (ref 70–99)
GLUCOSE SERPL-MCNC: 154 MG/DL — HIGH (ref 70–99)
GLUCOSE SERPL-MCNC: 94 MG/DL — SIGNIFICANT CHANGE UP (ref 70–99)
HAV IGM SER-ACNC: SIGNIFICANT CHANGE UP
HBV CORE IGM SER-ACNC: SIGNIFICANT CHANGE UP
HBV SURFACE AG SER-ACNC: SIGNIFICANT CHANGE UP
HCT VFR BLD CALC: 27.1 % — LOW (ref 34.5–45)
HCV AB S/CO SERPL IA: 0.08 S/CO — SIGNIFICANT CHANGE UP (ref 0–0.99)
HCV AB SERPL-IMP: SIGNIFICANT CHANGE UP
HGB BLD-MCNC: 9.2 G/DL — LOW (ref 11.5–15.5)
MACROCYTES BLD QL: SLIGHT — SIGNIFICANT CHANGE UP
MAGNESIUM SERPL-MCNC: 2.1 MG/DL — SIGNIFICANT CHANGE UP (ref 1.6–2.6)
MANUAL SMEAR VERIFICATION: SIGNIFICANT CHANGE UP
MCHC RBC-ENTMCNC: 33.1 PG — SIGNIFICANT CHANGE UP (ref 27–34)
MCHC RBC-ENTMCNC: 33.9 GM/DL — SIGNIFICANT CHANGE UP (ref 32–36)
MCV RBC AUTO: 97.5 FL — SIGNIFICANT CHANGE UP (ref 80–100)
NRBC # BLD: 0 /100 WBCS — SIGNIFICANT CHANGE UP (ref 0–0)
NRBC # FLD: 0 K/UL — SIGNIFICANT CHANGE UP (ref 0–0)
PHOSPHATE SERPL-MCNC: 2.4 MG/DL — LOW (ref 2.5–4.5)
PLAT MORPH BLD: ABNORMAL
PLATELET # BLD AUTO: 98 K/UL — LOW (ref 150–400)
PLATELET COUNT - ESTIMATE: ABNORMAL
POTASSIUM SERPL-MCNC: 3 MMOL/L — LOW (ref 3.5–5.3)
POTASSIUM SERPL-MCNC: 3.4 MMOL/L — LOW (ref 3.5–5.3)
POTASSIUM SERPL-MCNC: 3.7 MMOL/L — SIGNIFICANT CHANGE UP (ref 3.5–5.3)
POTASSIUM SERPL-MCNC: 4.3 MMOL/L — SIGNIFICANT CHANGE UP (ref 3.5–5.3)
POTASSIUM SERPL-SCNC: 3 MMOL/L — LOW (ref 3.5–5.3)
POTASSIUM SERPL-SCNC: 3.4 MMOL/L — LOW (ref 3.5–5.3)
POTASSIUM SERPL-SCNC: 3.7 MMOL/L — SIGNIFICANT CHANGE UP (ref 3.5–5.3)
POTASSIUM SERPL-SCNC: 4.3 MMOL/L — SIGNIFICANT CHANGE UP (ref 3.5–5.3)
PROT SERPL-MCNC: 6 G/DL — SIGNIFICANT CHANGE UP (ref 6–8.3)
RBC # BLD: 2.78 M/UL — LOW (ref 3.8–5.2)
RBC # FLD: 13.2 % — SIGNIFICANT CHANGE UP (ref 10.3–14.5)
RBC BLD AUTO: NORMAL — SIGNIFICANT CHANGE UP
SODIUM SERPL-SCNC: 126 MMOL/L — LOW (ref 135–145)
SODIUM SERPL-SCNC: 127 MMOL/L — LOW (ref 135–145)
VARIANT LYMPHS # BLD: 0.9 % — SIGNIFICANT CHANGE UP (ref 0–6)
WBC # BLD: 4.03 K/UL — SIGNIFICANT CHANGE UP (ref 3.8–10.5)
WBC # FLD AUTO: 4.03 K/UL — SIGNIFICANT CHANGE UP (ref 3.8–10.5)

## 2023-05-29 PROCEDURE — 99233 SBSQ HOSP IP/OBS HIGH 50: CPT

## 2023-05-29 RX ORDER — CEFTRIAXONE 500 MG/1
1000 INJECTION, POWDER, FOR SOLUTION INTRAMUSCULAR; INTRAVENOUS EVERY 24 HOURS
Refills: 0 | Status: COMPLETED | OUTPATIENT
Start: 2023-05-29 | End: 2023-05-31

## 2023-05-29 RX ORDER — SODIUM,POTASSIUM PHOSPHATES 278-250MG
1 POWDER IN PACKET (EA) ORAL EVERY 4 HOURS
Refills: 0 | Status: COMPLETED | OUTPATIENT
Start: 2023-05-29 | End: 2023-05-29

## 2023-05-29 RX ORDER — CEFTRIAXONE 500 MG/1
1000 INJECTION, POWDER, FOR SOLUTION INTRAMUSCULAR; INTRAVENOUS EVERY 24 HOURS
Refills: 0 | Status: DISCONTINUED | OUTPATIENT
Start: 2023-05-29 | End: 2023-05-29

## 2023-05-29 RX ORDER — POTASSIUM CHLORIDE 20 MEQ
40 PACKET (EA) ORAL ONCE
Refills: 0 | Status: COMPLETED | OUTPATIENT
Start: 2023-05-29 | End: 2023-05-29

## 2023-05-29 RX ORDER — SODIUM CHLORIDE 9 MG/ML
1000 INJECTION INTRAMUSCULAR; INTRAVENOUS; SUBCUTANEOUS
Refills: 0 | Status: DISCONTINUED | OUTPATIENT
Start: 2023-05-29 | End: 2023-05-31

## 2023-05-29 RX ORDER — POTASSIUM CHLORIDE 20 MEQ
40 PACKET (EA) ORAL EVERY 4 HOURS
Refills: 0 | Status: COMPLETED | OUTPATIENT
Start: 2023-05-29 | End: 2023-05-29

## 2023-05-29 RX ADMIN — CEFTRIAXONE 100 MILLIGRAM(S): 500 INJECTION, POWDER, FOR SOLUTION INTRAMUSCULAR; INTRAVENOUS at 15:35

## 2023-05-29 RX ADMIN — Medication 105 MILLIGRAM(S): at 23:29

## 2023-05-29 RX ADMIN — LOSARTAN POTASSIUM 50 MILLIGRAM(S): 100 TABLET, FILM COATED ORAL at 05:07

## 2023-05-29 RX ADMIN — Medication 40 MILLIEQUIVALENT(S): at 15:34

## 2023-05-29 RX ADMIN — Medication 1 MILLIGRAM(S): at 10:35

## 2023-05-29 RX ADMIN — Medication 105 MILLIGRAM(S): at 16:13

## 2023-05-29 RX ADMIN — Medication 40 MILLIEQUIVALENT(S): at 21:17

## 2023-05-29 RX ADMIN — Medication 1 PACKET(S): at 10:36

## 2023-05-29 RX ADMIN — Medication 105 MILLIGRAM(S): at 06:48

## 2023-05-29 RX ADMIN — ENOXAPARIN SODIUM 40 MILLIGRAM(S): 100 INJECTION SUBCUTANEOUS at 08:54

## 2023-05-29 RX ADMIN — Medication 40 MILLIEQUIVALENT(S): at 09:08

## 2023-05-29 RX ADMIN — Medication 1 TABLET(S): at 10:37

## 2023-05-29 RX ADMIN — Medication 1 PACKET(S): at 05:07

## 2023-05-29 RX ADMIN — SODIUM CHLORIDE 100 MILLILITER(S): 9 INJECTION INTRAMUSCULAR; INTRAVENOUS; SUBCUTANEOUS at 13:51

## 2023-05-29 RX ADMIN — Medication 40 MILLIEQUIVALENT(S): at 23:29

## 2023-05-29 NOTE — DIETITIAN INITIAL EVALUATION ADULT - REASON FOR ADMISSION
Unsteadiness on feet    72F former smoker with PMH of hypertension, anxiety, and EtOH dependence, presents to ED with son complaining of frequent falls and gait instability.  Patient reports has had difficulty ambulating over the last month which has worsened in the last couple of days resulting in multiple falls. A/w concern for alcohol withdrawal, hyponatremia, and w/u of gait instability.

## 2023-05-29 NOTE — DIETITIAN INITIAL EVALUATION ADULT - ADD RECOMMEND
1. Monitor weights, labs, BM's, skin integrity, p.o. intake.   2. Please document % PO intake in nursing flowsheet.   3. Honor food and beverage preferences within diet restriction of patient in an effort to maximize level of nutrient intake.   4. Continue Multivitamin, thiamine, folic acid supplementation.

## 2023-05-29 NOTE — PROGRESS NOTE ADULT - PROBLEM SELECTOR PLAN 5
Alk phos 167, AST//117, Tbili 2.5 on presentation. Likely related to EtOH liver disease.   -RUQ sono with hepatic steatosis  -CTM

## 2023-05-29 NOTE — DIETITIAN INITIAL EVALUATION ADULT - PROBLEM SELECTOR PLAN 5
Alk phos 167, AST//117, Tbili 2.5 on presentation. Likely related to EtOH liver disease.   -RUQ sono  -CTM

## 2023-05-29 NOTE — PROGRESS NOTE ADULT - PROBLEM SELECTOR PLAN 1
Pt reports gait instability over past few months with several assocated falls. Likely alcohol-related nutritional deficiency vs cerebellar CVA vs. electrolyte abnormality  -B12 wnl  -thiamine supplementation 500 tid, followed by 250 qd x3d  -CTH wnl   -Fall risk protocol   -PT consult  -MRI head Pt reports gait instability over past few months with several assocated falls. Likely alcohol-related nutritional deficiency vs cerebellar CVA vs. electrolyte abnormality  -B12 wnl  -thiamine supplementation 500 tid, followed by 250 qd x3d  -CTH wnl   -Fall risk protocol   -PT consult  -MRI head pending

## 2023-05-29 NOTE — DIETITIAN INITIAL EVALUATION ADULT - PROBLEM SELECTOR PLAN 6
Hgb 10.9 on presentation (unclear baseline). No s/s of bleeding.   -Iron studies in AM   -b12, folate   -CTM

## 2023-05-29 NOTE — DIETITIAN INITIAL EVALUATION ADULT - PERTINENT LABORATORY DATA
05-29    127<L>  |  92<L>  |  4<L>  ----------------------------<  154<H>  3.0<L>   |  21<L>  |  0.50    Ca    8.4      29 May 2023 09:33  Phos  2.4     05-29  Mg     2.10     05-29    TPro  6.0  /  Alb  3.7  /  TBili  1.1  /  DBili  x   /  AST  106<H>  /  ALT  85<H>  /  AlkPhos  139<H>  05-29

## 2023-05-29 NOTE — DIETITIAN INITIAL EVALUATION ADULT - PROBLEM SELECTOR PLAN 1
Na 122 on presentation. Likely beer potomania vs tea&toast diet.   -S/p 1L NS bolus in ED   -Repeat Na 125  -Urine studies   -Monitor I/Os   -Nutrition consult, perhaps would benefit from increase protein in diet

## 2023-05-29 NOTE — DIETITIAN INITIAL EVALUATION ADULT - OTHER INFO
Patient reports good appetite and PO intake in-house. Had [place] Toast, potatoes and fruit for breakfast. Plans to have cereal later-observed container at bedside. Patient denies any nausea/vomiting/diarrhea/constipation or difficulty chewing and swallowing. General healthful nutrition education provided. Importance of having well balanced, nutrient and protein dense foods discussed. Sobriety Nutrition Therapy education provided.

## 2023-05-29 NOTE — PROGRESS NOTE ADULT - PROBLEM SELECTOR PLAN 2
Pt with several falls over past month related to unstable gait. Denies head trauma or LOC.   -Likely related to alcohol intoxication vs nutritional deficiency vs. hyponatremia  -PT consult as below Pt with several falls over past month related to unstable gait. Denies head trauma or LOC.   -Likely related to alcohol intoxication vs nutritional deficiency vs. hyponatremia  -PT consult with recs for RW

## 2023-05-29 NOTE — PROGRESS NOTE ADULT - PROBLEM SELECTOR PLAN 4
Patient without history of alcohol withdrawal. Daily drinker 3+ drinks for several decades.   -S/p IV ativan 2mg and IV librium 50mg in ED   -Avoid librium iso transaminitis   -Sx triggered CIWA  -thiamine, folate, nutrition c/s Patient without history of alcohol withdrawal. Daily drinker 3+ drinks for several decades.   -S/p IV ativan 2mg and IV librium 50mg in ED   -Avoid librium iso transaminitis   -Sx triggered CIWA  - ETOH blood level  -thiamine, folate, nutrition c/s

## 2023-05-29 NOTE — PROGRESS NOTE ADULT - PROBLEM SELECTOR PLAN 6
Hgb 10.9 on presentation (unclear baseline). No s/s of bleeding.   -Iron studies with elevated ferritin, normal iron, low TIBC, ?AOCD  -b12, folate   -CTM

## 2023-05-29 NOTE — DIETITIAN INITIAL EVALUATION ADULT - PERTINENT MEDS FT
MEDICATIONS  (STANDING):  cefTRIAXone   IVPB 1000 milliGRAM(s) IV Intermittent every 24 hours  enoxaparin Injectable 40 milliGRAM(s) SubCutaneous every 24 hours  folic acid 1 milliGRAM(s) Oral daily  losartan 50 milliGRAM(s) Oral daily  multivitamin 1 Tablet(s) Oral daily  potassium chloride   Solution 40 milliEquivalent(s) Oral every 4 hours  sodium chloride 0.9%. 1000 milliLiter(s) (100 mL/Hr) IV Continuous <Continuous>  thiamine IVPB 500 milliGRAM(s) IV Intermittent every 8 hours    MEDICATIONS  (PRN):  LORazepam     Tablet 1 milliGRAM(s) Oral every 2 hours PRN CIWA-Ar score increase by 2 points and a total score of 7 or less  LORazepam   Injectable 1 milliGRAM(s) IV Push every 1 hour PRN CIWA-Ar score 8 or greater

## 2023-05-29 NOTE — DIETITIAN INITIAL EVALUATION ADULT - PROBLEM SELECTOR PLAN 3
Pt reports gait instability over past few months with several assocated falls. Likely alcohol-related nutritional deficiency vs cerebellar CVA.   -B12 level in AM   -thiamine supplementation   -CT wnl   -Fall risk protocol   -PT consult  -Consider Neuro consult in AM, patient scheduled for MR head as outpatient

## 2023-05-29 NOTE — PROGRESS NOTE ADULT - ASSESSMENT
72F former smoker with PMH of hypertension, anxiety, and EtOH dependence, presents to ED with son complaining of frequent falls and gait instability.  Patient reports has had difficulty ambulating over the last month which has worsened in the last couple of days resulting in multiple falls. A/w concern for alcohol withdrawal, hyponatremia, and w/u of gait instability.   72F former smoker with PMH of hypertension, anxiety, and EtOH dependence, presents to ED with son complaining of frequent falls and gait instability.  Patient reports has had difficulty ambulating over the last month which has worsened in the last couple of days resulting in multiple falls. A/w concern for alcohol withdrawal, hyponatremia, and w/u of gait instability.  MRI head pending, but stroke unlikely given symptoms.  Low CIWA may be 2/2 remaining ETOH in blood, will obtain 3pm ETOH level.

## 2023-05-29 NOTE — PROGRESS NOTE ADULT - SUBJECTIVE AND OBJECTIVE BOX
Patient is a 72y old  Female who presents with a chief complaint of Fall (28 May 2023 07:32)      SUBJECTIVE / OVERNIGHT EVENTS:    MEDICATIONS  (STANDING):  enoxaparin Injectable 40 milliGRAM(s) SubCutaneous every 24 hours  folic acid 1 milliGRAM(s) Oral daily  losartan 50 milliGRAM(s) Oral daily  multivitamin 1 Tablet(s) Oral daily  potassium chloride   Solution 40 milliEquivalent(s) Oral once  potassium phosphate / sodium phosphate Powder (PHOS-NaK) 1 Packet(s) Oral every 4 hours  sodium chloride 0.9%. 1000 milliLiter(s) (100 mL/Hr) IV Continuous <Continuous>  thiamine IVPB 500 milliGRAM(s) IV Intermittent every 8 hours    MEDICATIONS  (PRN):  LORazepam     Tablet 1 milliGRAM(s) Oral every 2 hours PRN CIWA-Ar score increase by 2 points and a total score of 7 or less  LORazepam   Injectable 1 milliGRAM(s) IV Push every 1 hour PRN CIWA-Ar score 8 or greater      CAPILLARY BLOOD GLUCOSE        I&O's Summary      Vital Signs Last 24 Hrs  T(C): 36.8 (29 May 2023 06:00), Max: 37.5 (29 May 2023 05:00)  T(F): 98.2 (29 May 2023 06:00), Max: 99.5 (29 May 2023 05:00)  HR: 81 (29 May 2023 06:00) (68 - 90)  BP: 123/74 (29 May 2023 06:00) (105/58 - 130/79)  BP(mean): --  RR: 16 (29 May 2023 06:00) (16 - 18)  SpO2: 98% (29 May 2023 06:00) (95% - 100%)    Parameters below as of 29 May 2023 06:00  Patient On (Oxygen Delivery Method): room air        PHYSICAL EXAM:  GENERAL: NAD, well-developed  HEAD: Atraumatic, Normocephalic  EYES: EOMI, PERRLA, conjunctiva and sclera clear  NECK: Supple, No JVD  CHEST/LUNG: Clear to auscultation bilaterally; No wheezes or crackles  HEART: Normal S1/S2; Regular rate and rhythm; No murmurs, rubs, or gallops  ABDOMEN: Soft, Nontender, Nondistended; Bowel sounds present  EXTREMITIES: 2+ Peripheral Pulses; No clubbing, cyanosis, or edema  PSYCH: A&Ox3  NEUROLOGY: no focal neurologic deficit  SKIN: No rashes or lesions    LABS:                        9.2    4.03  )-----------( 98       ( 29 May 2023 03:00 )             27.1      -    126<L>  |  91<L>  |  5<L>  ----------------------------<  121<H>  3.4<L>   |  23  |  0.52    Ca    8.4      29 May 2023 03:16  Phos  2.4       Mg     2.10         TPro  6.0  /  Alb  3.7  /  TBili  1.1  /  DBili  x   /  AST  106<H>  /  ALT  85<H>  /  AlkPhos  139<H>      PT/INR - ( 28 May 2023 04:00 )   PT: 12.4 sec;   INR: 1.07 ratio         PTT - ( 28 May 2023 04:00 )  PTT:26.8 sec      Urinalysis Basic - ( 28 May 2023 06:10 )    Color: Light Yellow / Appearance: Clear / S.009 / pH: x  Gluc: x / Ketone: Small  / Bili: Negative / Urobili: <2 mg/dL   Blood: x / Protein: Trace / Nitrite: Negative   Leuk Esterase: Large / RBC: 1 /HPF / WBC 15 /HPF   Sq Epi: x / Non Sq Epi: x / Bacteria: Many        RADIOLOGY & ADDITIONAL TESTS:    Imaging Personally Reviewed:    Consultant(s) Notes Reviewed:      Care Discussed with Consultants/Other Providers:   Patient is a 72y old  Female who presents with a chief complaint of Fall (28 May 2023 07:32)      SUBJECTIVE / OVERNIGHT EVENTS:  Na 122 o/n-->IVF increased to 100ccs/hr-->repeat Na 126    Patient anxious to go home.    MEDICATIONS  (STANDING):  enoxaparin Injectable 40 milliGRAM(s) SubCutaneous every 24 hours  folic acid 1 milliGRAM(s) Oral daily  losartan 50 milliGRAM(s) Oral daily  multivitamin 1 Tablet(s) Oral daily  potassium chloride   Solution 40 milliEquivalent(s) Oral once  potassium phosphate / sodium phosphate Powder (PHOS-NaK) 1 Packet(s) Oral every 4 hours  sodium chloride 0.9%. 1000 milliLiter(s) (100 mL/Hr) IV Continuous <Continuous>  thiamine IVPB 500 milliGRAM(s) IV Intermittent every 8 hours    MEDICATIONS  (PRN):  LORazepam     Tablet 1 milliGRAM(s) Oral every 2 hours PRN CIWA-Ar score increase by 2 points and a total score of 7 or less  LORazepam   Injectable 1 milliGRAM(s) IV Push every 1 hour PRN CIWA-Ar score 8 or greater      CAPILLARY BLOOD GLUCOSE        I&O's Summary      Vital Signs Last 24 Hrs  T(C): 36.8 (29 May 2023 06:00), Max: 37.5 (29 May 2023 05:00)  T(F): 98.2 (29 May 2023 06:00), Max: 99.5 (29 May 2023 05:00)  HR: 81 (29 May 2023 06:00) (68 - 90)  BP: 123/74 (29 May 2023 06:00) (105/58 - 130/79)  BP(mean): --  RR: 16 (29 May 2023 06:00) (16 - 18)  SpO2: 98% (29 May 2023 06:00) (95% - 100%)    Parameters below as of 29 May 2023 06:00  Patient On (Oxygen Delivery Method): room air        PHYSICAL EXAM:  GENERAL: alert, anxious  HEAD: Atraumatic, Normocephalic  EYES: EOMI, PERRLA, conjunctiva and sclera clear, no nystagmus  NECK: Supple, No JVD  CHEST/LUNG: Clear to auscultation bilaterally; No wheezes or crackles  HEART: Normal S1/S2; Regular rate and rhythm; No murmurs, rubs, or gallops  ABDOMEN: Soft, Nontender, Nondistended; Bowel sounds present  EXTREMITIES: 2+ Peripheral Pulses; No clubbing, cyanosis, or edema, no asterixis  PSYCH: A&Ox3  NEUROLOGY: symmetric facial muscles, 4/5-5/5 b/l strength in UE and LE, normal sensation to pinprick and light touch in b/l LE, midline tongue, normal finger-to-nose, patient able to ambulate with assistance, no ataxia observed  SKIN: No rashes or lesions    LABS:                        9.2    4.03  )-----------( 98       ( 29 May 2023 03:00 )             27.1          126<L>  |  91<L>  |  5<L>  ----------------------------<  121<H>  3.4<L>   |  23  |  0.52    Ca    8.4      29 May 2023 03:16  Phos  2.4       Mg     2.10         TPro  6.0  /  Alb  3.7  /  TBili  1.1  /  DBili  x   /  AST  106<H>  /  ALT  85<H>  /  AlkPhos  139<H>      PT/INR - ( 28 May 2023 04:00 )   PT: 12.4 sec;   INR: 1.07 ratio         PTT - ( 28 May 2023 04:00 )  PTT:26.8 sec      Urinalysis Basic - ( 28 May 2023 06:10 )    Color: Light Yellow / Appearance: Clear / S.009 / pH: x  Gluc: x / Ketone: Small  / Bili: Negative / Urobili: <2 mg/dL   Blood: x / Protein: Trace / Nitrite: Negative   Leuk Esterase: Large / RBC: 1 /HPF / WBC 15 /HPF   Sq Epi: x / Non Sq Epi: x / Bacteria: Many        RADIOLOGY & ADDITIONAL TESTS:    Imaging Personally Reviewed:    Consultant(s) Notes Reviewed:      Care Discussed with Consultants/Other Providers:

## 2023-05-29 NOTE — DIETITIAN INITIAL EVALUATION ADULT - ORAL INTAKE PTA/DIET HISTORY
Patient reports decreased appetite and PO intake over 5 months, likely associated with EtOH dependence though she did not elaborate. NKFA. Also notes weight loss around this time frame. Usual weight 145lbs reported and now weighs 138.8lbs this admission.

## 2023-05-29 NOTE — DIETITIAN INITIAL EVALUATION ADULT - ORAL NUTRITION SUPPLEMENTS
Recommend add Ensure Plus (350 kcal, 20 gm protein per 8 oz serving) BID for added calories and protein.

## 2023-05-29 NOTE — PROGRESS NOTE ADULT - PROBLEM SELECTOR PLAN 3
Na 122 on presentation. Likely beer potomania vs tea&toast diet.   -S/p 1L NS bolus in ED, continue NS 50ccs/hour x10h  -BMP q6h  -Urine lytes  -Monitor I/Os   -Nutrition consult, perhaps would benefit from increase protein in diet Na 122 on presentation. Likely beer potomania vs tea&toast diet.   -S/p 1L NS bolus in ED, continue NS 100ccs/hour   -BMP q6h  -Urine lytes  -Monitor I/Os   -Nutrition consult, perhaps would benefit from increase protein in diet

## 2023-05-30 ENCOUNTER — TRANSCRIPTION ENCOUNTER (OUTPATIENT)
Age: 73
End: 2023-05-30

## 2023-05-30 LAB
-  AMIKACIN: SIGNIFICANT CHANGE UP
-  AMOXICILLIN/CLAVULANIC ACID: SIGNIFICANT CHANGE UP
-  AMPICILLIN/SULBACTAM: SIGNIFICANT CHANGE UP
-  AMPICILLIN: SIGNIFICANT CHANGE UP
-  AZTREONAM: SIGNIFICANT CHANGE UP
-  CEFAZOLIN: SIGNIFICANT CHANGE UP
-  CEFEPIME: SIGNIFICANT CHANGE UP
-  CEFOXITIN: SIGNIFICANT CHANGE UP
-  CEFTRIAXONE: SIGNIFICANT CHANGE UP
-  CEFUROXIME: SIGNIFICANT CHANGE UP
-  CIPROFLOXACIN: SIGNIFICANT CHANGE UP
-  ERTAPENEM: SIGNIFICANT CHANGE UP
-  GENTAMICIN: SIGNIFICANT CHANGE UP
-  IMIPENEM: SIGNIFICANT CHANGE UP
-  LEVOFLOXACIN: SIGNIFICANT CHANGE UP
-  MEROPENEM: SIGNIFICANT CHANGE UP
-  NITROFURANTOIN: SIGNIFICANT CHANGE UP
-  PIPERACILLIN/TAZOBACTAM: SIGNIFICANT CHANGE UP
-  TOBRAMYCIN: SIGNIFICANT CHANGE UP
-  TRIMETHOPRIM/SULFAMETHOXAZOLE: SIGNIFICANT CHANGE UP
ALBUMIN SERPL ELPH-MCNC: 3.7 G/DL — SIGNIFICANT CHANGE UP (ref 3.3–5)
ALP SERPL-CCNC: 139 U/L — HIGH (ref 40–120)
ALT FLD-CCNC: 80 U/L — HIGH (ref 4–33)
ANION GAP SERPL CALC-SCNC: 11 MMOL/L — SIGNIFICANT CHANGE UP (ref 7–14)
ANION GAP SERPL CALC-SCNC: 11 MMOL/L — SIGNIFICANT CHANGE UP (ref 7–14)
AST SERPL-CCNC: 83 U/L — HIGH (ref 4–32)
BILIRUB SERPL-MCNC: 0.6 MG/DL — SIGNIFICANT CHANGE UP (ref 0.2–1.2)
BUN SERPL-MCNC: 3 MG/DL — LOW (ref 7–23)
BUN SERPL-MCNC: 3 MG/DL — LOW (ref 7–23)
CALCIUM SERPL-MCNC: 8.9 MG/DL — SIGNIFICANT CHANGE UP (ref 8.4–10.5)
CALCIUM SERPL-MCNC: 9.5 MG/DL — SIGNIFICANT CHANGE UP (ref 8.4–10.5)
CHLORIDE SERPL-SCNC: 97 MMOL/L — LOW (ref 98–107)
CHLORIDE SERPL-SCNC: 99 MMOL/L — SIGNIFICANT CHANGE UP (ref 98–107)
CO2 SERPL-SCNC: 19 MMOL/L — LOW (ref 22–31)
CO2 SERPL-SCNC: 22 MMOL/L — SIGNIFICANT CHANGE UP (ref 22–31)
CREAT SERPL-MCNC: 0.49 MG/DL — LOW (ref 0.5–1.3)
CREAT SERPL-MCNC: 0.5 MG/DL — SIGNIFICANT CHANGE UP (ref 0.5–1.3)
CULTURE RESULTS: SIGNIFICANT CHANGE UP
EGFR: 101 ML/MIN/1.73M2 — SIGNIFICANT CHANGE UP
EGFR: 101 ML/MIN/1.73M2 — SIGNIFICANT CHANGE UP
GLUCOSE SERPL-MCNC: 117 MG/DL — HIGH (ref 70–99)
GLUCOSE SERPL-MCNC: 141 MG/DL — HIGH (ref 70–99)
HCT VFR BLD CALC: 29.5 % — LOW (ref 34.5–45)
HGB BLD-MCNC: 9.5 G/DL — LOW (ref 11.5–15.5)
MAGNESIUM SERPL-MCNC: 1.7 MG/DL — SIGNIFICANT CHANGE UP (ref 1.6–2.6)
MCHC RBC-ENTMCNC: 32.2 GM/DL — SIGNIFICANT CHANGE UP (ref 32–36)
MCHC RBC-ENTMCNC: 32.8 PG — SIGNIFICANT CHANGE UP (ref 27–34)
MCV RBC AUTO: 101.7 FL — HIGH (ref 80–100)
METHOD TYPE: SIGNIFICANT CHANGE UP
NRBC # BLD: 0 /100 WBCS — SIGNIFICANT CHANGE UP (ref 0–0)
NRBC # FLD: 0 K/UL — SIGNIFICANT CHANGE UP (ref 0–0)
ORGANISM # SPEC MICROSCOPIC CNT: SIGNIFICANT CHANGE UP
ORGANISM # SPEC MICROSCOPIC CNT: SIGNIFICANT CHANGE UP
PHOSPHATE SERPL-MCNC: 2.1 MG/DL — LOW (ref 2.5–4.5)
PLATELET # BLD AUTO: 96 K/UL — LOW (ref 150–400)
POTASSIUM SERPL-MCNC: 4.3 MMOL/L — SIGNIFICANT CHANGE UP (ref 3.5–5.3)
POTASSIUM SERPL-MCNC: 5.2 MMOL/L — SIGNIFICANT CHANGE UP (ref 3.5–5.3)
POTASSIUM SERPL-SCNC: 4.3 MMOL/L — SIGNIFICANT CHANGE UP (ref 3.5–5.3)
POTASSIUM SERPL-SCNC: 5.2 MMOL/L — SIGNIFICANT CHANGE UP (ref 3.5–5.3)
PROT SERPL-MCNC: 6.3 G/DL — SIGNIFICANT CHANGE UP (ref 6–8.3)
RBC # BLD: 2.9 M/UL — LOW (ref 3.8–5.2)
RBC # FLD: 13.4 % — SIGNIFICANT CHANGE UP (ref 10.3–14.5)
SODIUM SERPL-SCNC: 129 MMOL/L — LOW (ref 135–145)
SODIUM SERPL-SCNC: 130 MMOL/L — LOW (ref 135–145)
SPECIMEN SOURCE: SIGNIFICANT CHANGE UP
WBC # BLD: 4.09 K/UL — SIGNIFICANT CHANGE UP (ref 3.8–10.5)
WBC # FLD AUTO: 4.09 K/UL — SIGNIFICANT CHANGE UP (ref 3.8–10.5)

## 2023-05-30 PROCEDURE — 99232 SBSQ HOSP IP/OBS MODERATE 35: CPT | Mod: GC

## 2023-05-30 RX ORDER — CEFPODOXIME PROXETIL 100 MG
1 TABLET ORAL
Qty: 12 | Refills: 0
Start: 2023-05-30 | End: 2023-06-04

## 2023-05-30 RX ORDER — FOLIC ACID 0.8 MG
1 TABLET ORAL
Qty: 0 | Refills: 0 | DISCHARGE
Start: 2023-05-30

## 2023-05-30 RX ORDER — MAGNESIUM SULFATE 500 MG/ML
2 VIAL (ML) INJECTION ONCE
Refills: 0 | Status: COMPLETED | OUTPATIENT
Start: 2023-05-30 | End: 2023-05-30

## 2023-05-30 RX ORDER — THIAMINE MONONITRATE (VIT B1) 100 MG
1 TABLET ORAL
Qty: 0 | Refills: 0 | DISCHARGE
Start: 2023-05-30

## 2023-05-30 RX ORDER — SODIUM,POTASSIUM PHOSPHATES 278-250MG
1 POWDER IN PACKET (EA) ORAL EVERY 6 HOURS
Refills: 0 | Status: COMPLETED | OUTPATIENT
Start: 2023-05-30 | End: 2023-05-30

## 2023-05-30 RX ADMIN — CEFTRIAXONE 100 MILLIGRAM(S): 500 INJECTION, POWDER, FOR SOLUTION INTRAMUSCULAR; INTRAVENOUS at 13:26

## 2023-05-30 RX ADMIN — Medication 1 PACKET(S): at 16:35

## 2023-05-30 RX ADMIN — Medication 105 MILLIGRAM(S): at 23:23

## 2023-05-30 RX ADMIN — Medication 1 PACKET(S): at 08:13

## 2023-05-30 RX ADMIN — Medication 25 GRAM(S): at 08:13

## 2023-05-30 RX ADMIN — Medication 105 MILLIGRAM(S): at 06:00

## 2023-05-30 RX ADMIN — Medication 1 TABLET(S): at 13:27

## 2023-05-30 RX ADMIN — Medication 105 MILLIGRAM(S): at 16:18

## 2023-05-30 RX ADMIN — ENOXAPARIN SODIUM 40 MILLIGRAM(S): 100 INJECTION SUBCUTANEOUS at 08:18

## 2023-05-30 RX ADMIN — LOSARTAN POTASSIUM 50 MILLIGRAM(S): 100 TABLET, FILM COATED ORAL at 05:02

## 2023-05-30 RX ADMIN — Medication 1 MILLIGRAM(S): at 13:27

## 2023-05-30 NOTE — DISCHARGE NOTE PROVIDER - CARE PROVIDER_API CALL
Ariana Beard  [facility]  [address] [place] [id number]  Phone: [phone]  Fax: [phone]  Established Patient  Follow Up Time: 1 week

## 2023-05-30 NOTE — PROGRESS NOTE ADULT - PROBLEM SELECTOR PLAN 3
Na 122 on presentation. Likely beer potomania vs tea&toast diet.   -S/p 1L NS bolus in ED, continue NS 100ccs/hour   -BMP q6h  -Urine lytes  -Monitor I/Os   -Nutrition consult, perhaps would benefit from increase protein in diet Na 122 on presentation. Likely beer potomania vs tea&toast diet.   -S/p 1L NS bolus in ED, continue NS 100ccs/hour   -BMP q6h  -Urine lytes  -Monitor I/Os   -Nutrition consult, appreciate recs

## 2023-05-30 NOTE — PROGRESS NOTE ADULT - PROBLEM SELECTOR PLAN 1
Pt reports gait instability over past few months with several assocated falls. Likely alcohol-related nutritional deficiency vs cerebellar CVA vs. electrolyte abnormality  -B12 wnl  -thiamine supplementation 500 tid, followed by 250 qd x3d  -CTH wnl   -Fall risk protocol   -PT consult  -MRI head pending Pt reports gait instability over past few months with several assocated falls. Likely alcohol-related nutritional deficiency vs cerebellar CVA vs. electrolyte abnormality  -B12 wnl  -thiamine supplementation 500 tid, followed by 250 qd x3d  -CTH wnl   -Fall risk protocol   -PT rec RW  -MRI head outpt on 6/19

## 2023-05-30 NOTE — DISCHARGE NOTE PROVIDER - NSDCCPCAREPLAN_GEN_ALL_CORE_FT
PRINCIPAL DISCHARGE DIAGNOSIS  Diagnosis: Unsteady gait  Assessment and Plan of Treatment: You were admitted to the hospital because you have been unstead while walking at home and have fallen already several times.  During your stay, you had multiple tests, including a CT of your head, urine/blood cultures, and electrolyte panels done.  The cause for your unsteady gait is likely multifactorial; you were found to have an abnormally low sodium level that was improved by continual IV fluids.  You were also found to have a urinary tract infection and were given IV antibiotics.  The CT scan of your head did not find any old stroke or cause for your gait instability.  PLEASE FOLLOW UP WITH YOUR PCP AND PSYCHIATRIST.  PLEASE CUT BACK/STOP YOUR DRINKING.  The cause of your low sodium levels is likely due to your drinking.  Please return to the ED immediately if you have any of the following symptoms: severe chest pain, sudden severe headache, inability to move, fall/fracture, trouble breathing, thoughts/desire to harm yourself or others, increasing tremors and agitation, seizures or seizure-like activity      SECONDARY DISCHARGE DIAGNOSES  Diagnosis: Alcohol dependence with withdrawal  Assessment and Plan of Treatment:      PRINCIPAL DISCHARGE DIAGNOSIS  Diagnosis: Unsteady gait  Assessment and Plan of Treatment: You were admitted to the hospital because you have been unstead while walking at home and have fallen already several times.  During your stay, you had multiple tests, including a CT of your head, urine/blood cultures, and electrolyte panels done.  The cause for your unsteady gait is likely multifactorial; you were found to have an abnormally low sodium level that was improved by continual IV fluids.  You were also found to have a urinary tract infection and were given IV antibiotics.  The CT scan of your head did not find any old stroke or cause for your gait instability.   Your physical exam on admission was concerning for a condition called Wernicke - Korsakoff syndrome.  You had impaired short-term memory, disorientation, an unsteady, wide-based gait, and you had an unusual movement of your eyes called "nystagmus."  This is a condition that is caused by low vitamin B1 or thiamine intake for many years and results in damage to parts of your brain that may or may not be reversible.  There is no cure for this.  You were given IV thiamine while in the hospital and will need to take thiamine daily at home to prevent this from worsening.  Your nystagmus resolved after 1 day of admission, but you were still noted to have an unsteady gait, impaired short-term memory, and fine tremors/coordination of your hands.  Please note that imaging of the brain by CT is an insensitive test for this syndrome.  Please follow-up with your MRI brain on 6/19.  You were seen by the physical medicine and rehabilitation team which recommended an acute inpatient rehabilitation to improve your walking and motor function.  PLEASE FOLLOW UP WITH YOUR PCP AND PSYCHIATRIST.  Please return to the ED immediately if you have any of the following symptoms: severe chest pain, sudden severe headache, inability to move, fall/fracture, trouble breathing, thoughts/desire to harm yourself or others, increasing tremors and agitation, seizures or seizure-like activity, increasing confusion      SECONDARY DISCHARGE DIAGNOSES  Diagnosis: Alcohol dependence with withdrawal  Assessment and Plan of Treatment: You have a history of drinking significant amounts of alcohol for much of your life.  You were treated with a protocol called [facility] to prevent withdrawal side effects from immediately stopping alcohol intake while you were admitted to the hospital.  You were not found to have any significant withdrawal symptoms.

## 2023-05-30 NOTE — PROGRESS NOTE ADULT - ASSESSMENT
72F former smoker with PMH of hypertension, anxiety, and EtOH dependence, presents to ED with son complaining of frequent falls and gait instability.  Patient reports has had difficulty ambulating over the last month which has worsened in the last couple of days resulting in multiple falls. A/w concern for alcohol withdrawal, hyponatremia, and w/u of gait instability.  MRI head pending, but stroke unlikely given symptoms.  Low CIWA may be 2/2 remaining ETOH in blood, will obtain 3pm ETOH level. 72F former smoker with PMH of hypertension, anxiety, and EtOH dependence, presents to ED with son complaining of frequent falls and gait instability.  Patient reports has had difficulty ambulating over the last month which has worsened in the last couple of days resulting in multiple falls. A/w concern for alcohol withdrawal, hyponatremia, and w/u of gait instability.  MRI head pending, but stroke unlikely given symptoms; will d/c and have patient obtain 6/19 MRI head as scheduled for outpt.  >48 hours since last drink, CIWA <3    Last Na 130

## 2023-05-30 NOTE — PROGRESS NOTE ADULT - SUBJECTIVE AND OBJECTIVE BOX
Patient is a 72y old  Female who presents with a chief complaint of Unsteadiness on feet    72F former smoker with PMH of hypertension, anxiety, and EtOH dependence, presents to ED with son complaining of frequent falls and gait instability.  Patient reports has had difficulty ambulating over the last month which has worsened in the last couple of days resulting in multiple falls. A/w concern for alcohol withdrawal, hyponatremia, and w/u of gait instability.   (29 May 2023 14:47)      SUBJECTIVE / OVERNIGHT EVENTS:    MEDICATIONS  (STANDING):  cefTRIAXone   IVPB 1000 milliGRAM(s) IV Intermittent every 24 hours  enoxaparin Injectable 40 milliGRAM(s) SubCutaneous every 24 hours  folic acid 1 milliGRAM(s) Oral daily  losartan 50 milliGRAM(s) Oral daily  magnesium sulfate  IVPB 2 Gram(s) IV Intermittent once  multivitamin 1 Tablet(s) Oral daily  potassium phosphate / sodium phosphate Powder (PHOS-NaK) 1 Packet(s) Oral every 6 hours  sodium chloride 0.9%. 1000 milliLiter(s) (100 mL/Hr) IV Continuous <Continuous>  thiamine IVPB 500 milliGRAM(s) IV Intermittent every 8 hours    MEDICATIONS  (PRN):  LORazepam     Tablet 1 milliGRAM(s) Oral every 2 hours PRN CIWA-Ar score increase by 2 points and a total score of 7 or less  LORazepam   Injectable 1 milliGRAM(s) IV Push every 1 hour PRN CIWA-Ar score 8 or greater      CAPILLARY BLOOD GLUCOSE        I&O's Summary    29 May 2023 07:01  -  30 May 2023 07:00  --------------------------------------------------------  IN: 450 mL / OUT: 130 mL / NET: 320 mL        Vital Signs Last 24 Hrs  T(C): 37.1 (30 May 2023 04:57), Max: 37.6 (30 May 2023 04:00)  T(F): 98.8 (30 May 2023 04:57), Max: 99.7 (30 May 2023 04:00)  HR: 80 (30 May 2023 04:57) (74 - 98)  BP: 141/87 (30 May 2023 04:57) (108/72 - 145/83)  BP(mean): --  RR: 16 (30 May 2023 04:57) (16 - 17)  SpO2: 100% (30 May 2023 04:57) (95% - 100%)    Parameters below as of 30 May 2023 04:57  Patient On (Oxygen Delivery Method): room air        PHYSICAL EXAM:  GENERAL: NAD, well-developed  HEAD: Atraumatic, Normocephalic  EYES: EOMI, PERRLA, conjunctiva and sclera clear  NECK: Supple, No JVD  CHEST/LUNG: Clear to auscultation bilaterally; No wheezes or crackles  HEART: Normal S1/S2; Regular rate and rhythm; No murmurs, rubs, or gallops  ABDOMEN: Soft, Nontender, Nondistended; Bowel sounds present  EXTREMITIES: 2+ Peripheral Pulses; No clubbing, cyanosis, or edema  PSYCH: A&Ox3  NEUROLOGY: no focal neurologic deficit  SKIN: No rashes or lesions    LABS:                        9.5    4.09  )-----------( 96       ( 30 May 2023 03:20 )             29.5      05-30    129<L>  |  99  |  3<L>  ----------------------------<  117<H>  5.2   |  19<L>  |  0.49<L>    Ca    8.9      30 May 2023 03:20  Phos  2.1     05-30  Mg     1.70     05-30    TPro  6.3  /  Alb  3.7  /  TBili  0.6  /  DBili  x   /  AST  83<H>  /  ALT  80<H>  /  AlkPhos  139<H>  05-30              RADIOLOGY & ADDITIONAL TESTS:    Imaging Personally Reviewed:    Consultant(s) Notes Reviewed:      Care Discussed with Consultants/Other Providers:   Patient is a 72y old  Female who presents with a chief complaint of Unsteadiness on feet    72F former smoker with PMH of hypertension, anxiety, and EtOH dependence, presents to ED with son complaining of frequent falls and gait instability.  Patient reports has had difficulty ambulating over the last month which has worsened in the last couple of days resulting in multiple falls. A/w concern for alcohol withdrawal, hyponatremia, and w/u of gait instability.   (29 May 2023 14:47)      SUBJECTIVE / OVERNIGHT EVENTS:    No o/n events.  Patient states that she slept poorly.  Eager to go home.    MEDICATIONS  (STANDING):  cefTRIAXone   IVPB 1000 milliGRAM(s) IV Intermittent every 24 hours  enoxaparin Injectable 40 milliGRAM(s) SubCutaneous every 24 hours  folic acid 1 milliGRAM(s) Oral daily  losartan 50 milliGRAM(s) Oral daily  magnesium sulfate  IVPB 2 Gram(s) IV Intermittent once  multivitamin 1 Tablet(s) Oral daily  potassium phosphate / sodium phosphate Powder (PHOS-NaK) 1 Packet(s) Oral every 6 hours  sodium chloride 0.9%. 1000 milliLiter(s) (100 mL/Hr) IV Continuous <Continuous>  thiamine IVPB 500 milliGRAM(s) IV Intermittent every 8 hours    MEDICATIONS  (PRN):  LORazepam     Tablet 1 milliGRAM(s) Oral every 2 hours PRN CIWA-Ar score increase by 2 points and a total score of 7 or less  LORazepam   Injectable 1 milliGRAM(s) IV Push every 1 hour PRN CIWA-Ar score 8 or greater      CAPILLARY BLOOD GLUCOSE        I&O's Summary    29 May 2023 07:01  -  30 May 2023 07:00  --------------------------------------------------------  IN: 450 mL / OUT: 130 mL / NET: 320 mL        Vital Signs Last 24 Hrs  T(C): 37.1 (30 May 2023 04:57), Max: 37.6 (30 May 2023 04:00)  T(F): 98.8 (30 May 2023 04:57), Max: 99.7 (30 May 2023 04:00)  HR: 80 (30 May 2023 04:57) (74 - 98)  BP: 141/87 (30 May 2023 04:57) (108/72 - 145/83)  BP(mean): --  RR: 16 (30 May 2023 04:57) (16 - 17)  SpO2: 100% (30 May 2023 04:57) (95% - 100%)    Parameters below as of 30 May 2023 04:57  Patient On (Oxygen Delivery Method): room air        PHYSICAL EXAM:  GENERAL: alert, anxious  HEAD: Atraumatic, Normocephalic  EYES: EOMI, PERRLA, conjunctiva and sclera clear, no nystagmus  NECK: Supple, No JVD  CHEST/LUNG: Clear to auscultation bilaterally; No wheezes or crackles  HEART: Normal S1/S2; Regular rate and rhythm; No murmurs, rubs, or gallops  ABDOMEN: Soft, Nontender, Nondistended; Bowel sounds present  EXTREMITIES: 2+ Peripheral Pulses; No clubbing, cyanosis, or edema, no asterixis  PSYCH: A&Ox3  NEUROLOGY: symmetric facial muscles, 4/5-5/5 b/l strength in UE and LE, normal sensation to pinprick and light touch in b/l LE, midline tongue, normal finger-to-nose, patient able to ambulate with assistance, no ataxia observed  SKIN: No rashes or lesions    LABS:                        9.5    4.09  )-----------( 96       ( 30 May 2023 03:20 )             29.5      05-30    129<L>  |  99  |  3<L>  ----------------------------<  117<H>  5.2   |  19<L>  |  0.49<L>    Ca    8.9      30 May 2023 03:20  Phos  2.1     05-30  Mg     1.70     05-30    TPro  6.3  /  Alb  3.7  /  TBili  0.6  /  DBili  x   /  AST  83<H>  /  ALT  80<H>  /  AlkPhos  139<H>  05-30              RADIOLOGY & ADDITIONAL TESTS:    Imaging Personally Reviewed:    Consultant(s) Notes Reviewed:      Care Discussed with Consultants/Other Providers:

## 2023-05-30 NOTE — DISCHARGE NOTE PROVIDER - NSDCMRMEDTOKEN_GEN_ALL_CORE_FT
folic acid 1 mg oral tablet: 1 tab(s) orally once a day  irbesartan 150 mg oral tablet: 1 orally once a day  Multiple Vitamins oral tablet: 1 tab(s) orally once a day  thiamine: 1 tab(s) once a day   folic acid 1 mg oral tablet: 1 tab(s) orally once a day  irbesartan 150 mg oral tablet: 1 orally once a day  loperamide 2 mg oral capsule: 1 cap(s) orally every 6 hours As needed Diarrhea  Multiple Vitamins oral tablet: 1 tab(s) orally once a day  Rolling walker: Use  thiamine: 1 tab(s) once a day

## 2023-05-30 NOTE — DISCHARGE NOTE PROVIDER - HOSPITAL COURSE
72F former smoker with PMH of hypertension, anxiety, and EtOH dependence (drinks 8-10 oz vodka daily for many years), admitted for gait instability and recurrent falls for past 6 months. CTH wnl, BMP notable for hyponatremia to 122 likely 2/2 "beer potomania."  Patient was placed on CIWA and IVF with continuous NS.  Patient seen by PT, who recommended rolling walker.  Patient not interested in stopping drinking because she likes it.  Course also c/b UTI with Kleb pneumo; she was continued on ceftriaxone.    Patient medically optimized for discharge, given resources for substance use should she be interested.  To f/u with PCP, psychiatrist. 72F former smoker with PMH of hypertension, anxiety, and EtOH dependence (drinks 8-10 oz vodka daily for 40+ years), admitted for gait instability and recurrent falls for past 6 months. CTH wnl, BMP notable for hyponatremia to 122 likely 2/2 "beer potomania."  Patient was placed on CIWA and IVF with continuous NS.  Patient seen by PT, who recommended rolling walker.  Patient not interested in stopping drinking because she likes it.  Course also c/b UTI with Kleb pneumo treated with ceftriaxone x3d.      On admission, patient noted to have symptoms c/f Wernicke-Korsakoff: lateral nystagmus (with rightward gaze), disorientation (was not aware that she had been staying in same room the entire admission), and wide-based ataxia.  Also noted to have confabulations (would describe coordinating events with nighttime nursing staff) and impaired short term memory (forgot conversations, people, etc from day prior).  Patient was given IV thiamine, B9, B12, multivitamins, and electrolytes repleted.  CTH negative.    Patient medically optimized for discharge to acute inpatient rehabilitation.

## 2023-05-30 NOTE — DISCHARGE NOTE PROVIDER - NSFOLLOWUPCLINICS_GEN_ALL_ED_FT
[facility]  Psychiatry  [id number] [place] [address]  Phone: [phone]  Fax:   Follow Up Time: 2 weeks

## 2023-05-30 NOTE — DISCHARGE NOTE PROVIDER - NSDCCPTREATMENT_GEN_ALL_CORE_FT
PRINCIPAL PROCEDURE  Procedure: CT head wo contrast  Findings and Treatment: FINDINGS:  VENTRICLES, SULCI AND BASAL CISTERNS:  Symmetric prominence of the   ventricles and sulci in the setting of cortical volume loss. Clear basal   cisterns.  INTRA-AXIAL:  No mass, hemorrhage, or vasogenic edema.  Mild patchy   hypodensities within the periventricular and subcortical white matter,   although nonspecific, likely reflect chronic microvascular disease.  EXTRA-AXIAL:  No mass or collection is seen.  VISUALIZED SINUSES:  Clear.  VISUALIZED MASTOIDS:  Clear.  CALVARIUM:  Normal.  ORBITS: Unremarkable.  IMPRESSION:  No acute intracranial hemorrhage or calvarial fracture.  --- End of Report ---

## 2023-05-30 NOTE — PROGRESS NOTE ADULT - PROBLEM SELECTOR PLAN 4
Patient without history of alcohol withdrawal. Daily drinker 3+ drinks for several decades.   -S/p IV ativan 2mg and IV librium 50mg in ED   -Avoid librium iso transaminitis   -Sx triggered CIWA  - ETOH blood level  -thiamine, folate, nutrition c/s

## 2023-05-30 NOTE — PROGRESS NOTE ADULT - PROBLEM SELECTOR PLAN 2
Pt with several falls over past month related to unstable gait. Denies head trauma or LOC.   -Likely related to alcohol intoxication vs nutritional deficiency vs. hyponatremia  -PT consult with recs for RW

## 2023-05-31 LAB
ANION GAP SERPL CALC-SCNC: 11 MMOL/L — SIGNIFICANT CHANGE UP (ref 7–14)
BUN SERPL-MCNC: 4 MG/DL — LOW (ref 7–23)
CALCIUM SERPL-MCNC: 9.6 MG/DL — SIGNIFICANT CHANGE UP (ref 8.4–10.5)
CHLORIDE SERPL-SCNC: 93 MMOL/L — LOW (ref 98–107)
CHLORIDE UR-SCNC: 41 MMOL/L — SIGNIFICANT CHANGE UP
CO2 SERPL-SCNC: 24 MMOL/L — SIGNIFICANT CHANGE UP (ref 22–31)
CREAT ?TM UR-MCNC: 18 MG/DL — SIGNIFICANT CHANGE UP
CREAT SERPL-MCNC: 0.53 MG/DL — SIGNIFICANT CHANGE UP (ref 0.5–1.3)
EGFR: 99 ML/MIN/1.73M2 — SIGNIFICANT CHANGE UP
GLUCOSE SERPL-MCNC: 112 MG/DL — HIGH (ref 70–99)
HCT VFR BLD CALC: 32.9 % — LOW (ref 34.5–45)
HGB BLD-MCNC: 10.7 G/DL — LOW (ref 11.5–15.5)
MAGNESIUM SERPL-MCNC: 1.9 MG/DL — SIGNIFICANT CHANGE UP (ref 1.6–2.6)
MCHC RBC-ENTMCNC: 32.5 GM/DL — SIGNIFICANT CHANGE UP (ref 32–36)
MCHC RBC-ENTMCNC: 33.5 PG — SIGNIFICANT CHANGE UP (ref 27–34)
MCV RBC AUTO: 103.1 FL — HIGH (ref 80–100)
NRBC # BLD: 0 /100 WBCS — SIGNIFICANT CHANGE UP (ref 0–0)
NRBC # FLD: 0 K/UL — SIGNIFICANT CHANGE UP (ref 0–0)
OSMOLALITY UR: 135 MOSM/KG — SIGNIFICANT CHANGE UP (ref 50–1200)
PHOSPHATE SERPL-MCNC: 3.6 MG/DL — SIGNIFICANT CHANGE UP (ref 2.5–4.5)
PLATELET # BLD AUTO: 157 K/UL — SIGNIFICANT CHANGE UP (ref 150–400)
POTASSIUM SERPL-MCNC: 4.5 MMOL/L — SIGNIFICANT CHANGE UP (ref 3.5–5.3)
POTASSIUM SERPL-SCNC: 4.5 MMOL/L — SIGNIFICANT CHANGE UP (ref 3.5–5.3)
POTASSIUM UR-SCNC: 10 MMOL/L — SIGNIFICANT CHANGE UP
RBC # BLD: 3.19 M/UL — LOW (ref 3.8–5.2)
RBC # FLD: 13.4 % — SIGNIFICANT CHANGE UP (ref 10.3–14.5)
SARS-COV-2 RNA SPEC QL NAA+PROBE: SIGNIFICANT CHANGE UP
SODIUM SERPL-SCNC: 128 MMOL/L — LOW (ref 135–145)
SODIUM UR-SCNC: 33 MMOL/L — SIGNIFICANT CHANGE UP
UUN UR-MCNC: 92 MG/DL — SIGNIFICANT CHANGE UP
WBC # BLD: 5.82 K/UL — SIGNIFICANT CHANGE UP (ref 3.8–10.5)
WBC # FLD AUTO: 5.82 K/UL — SIGNIFICANT CHANGE UP (ref 3.8–10.5)

## 2023-05-31 PROCEDURE — 99222 1ST HOSP IP/OBS MODERATE 55: CPT

## 2023-05-31 PROCEDURE — 99232 SBSQ HOSP IP/OBS MODERATE 35: CPT | Mod: GC

## 2023-05-31 RX ORDER — LOPERAMIDE HCL 2 MG
2 TABLET ORAL EVERY 6 HOURS
Refills: 0 | Status: DISCONTINUED | OUTPATIENT
Start: 2023-05-31 | End: 2023-06-01

## 2023-05-31 RX ORDER — SODIUM CHLORIDE 9 MG/ML
1000 INJECTION INTRAMUSCULAR; INTRAVENOUS; SUBCUTANEOUS
Refills: 0 | Status: DISCONTINUED | OUTPATIENT
Start: 2023-05-31 | End: 2023-06-01

## 2023-05-31 RX ORDER — LOPERAMIDE HCL 2 MG
1 TABLET ORAL
Qty: 0 | Refills: 0 | DISCHARGE
Start: 2023-05-31

## 2023-05-31 RX ADMIN — Medication 105 MILLIGRAM(S): at 06:00

## 2023-05-31 RX ADMIN — SODIUM CHLORIDE 100 MILLILITER(S): 9 INJECTION INTRAMUSCULAR; INTRAVENOUS; SUBCUTANEOUS at 07:46

## 2023-05-31 RX ADMIN — Medication 105 MILLIGRAM(S): at 22:06

## 2023-05-31 RX ADMIN — Medication 105 MILLIGRAM(S): at 15:46

## 2023-05-31 RX ADMIN — LOSARTAN POTASSIUM 50 MILLIGRAM(S): 100 TABLET, FILM COATED ORAL at 05:59

## 2023-05-31 RX ADMIN — ENOXAPARIN SODIUM 40 MILLIGRAM(S): 100 INJECTION SUBCUTANEOUS at 09:25

## 2023-05-31 RX ADMIN — CEFTRIAXONE 100 MILLIGRAM(S): 500 INJECTION, POWDER, FOR SOLUTION INTRAMUSCULAR; INTRAVENOUS at 14:31

## 2023-05-31 NOTE — PROGRESS NOTE ADULT - ASSESSMENT
72F former smoker with PMH of hypertension, anxiety, and EtOH dependence, presents to ED with son complaining of frequent falls and gait instability.  Patient reports has had difficulty ambulating over the last month which has worsened in the last couple of days resulting in multiple falls. A/w concern for alcohol withdrawal, hyponatremia, and w/u of gait instability.  MRI head pending, but stroke unlikely given symptoms; will d/c and have patient obtain 6/19 MRI head as scheduled for outpt.  >48 hours since last drink, CIWA <3    Last Na 130     72F former smoker with PMH of hypertension, anxiety, and EtOH dependence, presents to ED with son complaining of frequent falls and gait instability.  Patient reports has had difficulty ambulating over the last month which has worsened in the last couple of days resulting in multiple falls. A/w concern for alcohol withdrawal, hyponatremia, and w/u of gait instability.  MRI head pending, but stroke unlikely given symptoms; will d/c and have patient obtain 6/19 MRI head as scheduled for outpt.  >48 hours since last drink, CIWA <3    Last Na 128, restarted IVF at 100ccs/hour  concerned for Wernicke's korsakoff (on admission, with nystagmus on visualizing R lateral field, wide-based gait, impaired finger-to-nose, disorientation, impaired short-term memory), will continue nutritional supplementation and supportive care.

## 2023-05-31 NOTE — PROVIDER CONTACT NOTE (OTHER) - REASON
patient confused and delirious throughout shift. needs to be reoriented throughout shift to situation.
pt more forgetful than the day before

## 2023-05-31 NOTE — PROVIDER CONTACT NOTE (OTHER) - ASSESSMENT
pt forgot she was in the same hospital room the day prior. pt gets out of bed without assistance or using the call bell like she did yeserday. vital signs stable. no signs of acute distress noted.
patient confused and delirious throughout shift. needs to be reoriented throughout shift to situation.

## 2023-05-31 NOTE — PROGRESS NOTE ADULT - PROBLEM SELECTOR PLAN 5
Alk phos 167, AST//117, Tbili 2.5 on presentation. Likely related to EtOH liver disease.   -RUQ sono with hepatic steatosis  -CTM - - -

## 2023-05-31 NOTE — PROGRESS NOTE ADULT - PROBLEM SELECTOR PLAN 4
Patient without history of alcohol withdrawal. Daily drinker 3+ drinks for several decades.   -S/p IV ativan 2mg and IV librium 50mg in ED   -Avoid librium iso transaminitis   -Sx triggered CIWA  - ETOH blood level  -thiamine, folate, nutrition c/s Patient without history of alcohol withdrawal. Daily drinker 3+ drinks for several decades.   -S/p IV ativan 2mg and IV librium 50mg in ED   -Avoid librium iso transaminitis   -Sx triggered CIWA  - ETOH blood level <10 on 5/29  -thiamine, folate, nutrition c/s

## 2023-05-31 NOTE — CONSULT NOTE ADULT - SUBJECTIVE AND OBJECTIVE BOX
Patient is a 72y old  Female who presents with a chief complaint of Fall (31 May 2023 07:25)      HPI:  72F former smoker with PMH of hypertension, anxiety, and EtOH dependence, presents to ED with son complaining of frequent falls and gait instability.  Patient reports has had difficulty ambulating over the last month which has worsened in the last couple of days resulting in multiple falls.  Patient now newly ambulating with cane.  Patient states having extreme anxiety when ambulating, d/t fear of falling.  Per son patient has also had significant short-term memory loss over the last few weeks as well as tremors.  Patient also complaining of left foot paresthesias and Lt knee pain.  Patient reports has been daily drinker for years and has never stopped long enough to go through withdrawal. Denies history of seizures, intubation, hospitalization for withdrawal.  Patient's last drink was earlier this afternoon.  Additionally, patient notes 10lb weight loss over the past few weeks in setting of decreased appetite. Patient also denies fever/chills, cough, congestion, chest pain, shortness of breath, abdominal pain, nausea/vomiting/diarrhea, urinary symptoms, dizziness, weakness or any other symptoms at this time.    In the ED, patient was afebrile, HR , -156, RR 21 (satting 99% on RA). Labs notable for H/H 10.9/31.1, platelets 117, Na 122, Bicarb 21, Mag 1.2, Alk phos 167, AST//117, Tbili 2.5. XR L knee wnl. CTH with no acute changes. S/p 1L NS bolus. S/p IV ativan 2mg x 1, IV librium 50mg x 1 in ED.  (28 May 2023 05:37)    seen by PT on 5/28, recommended for home with RW    REVIEW OF SYSTEMS     PAST MEDICAL & SURGICAL HISTORY  HTN (hypertension)  Anxiety  EtOH dependence  No significant past surgical history      SOCIAL HISTORY  + etoh use    FUNCTIONAL HISTORY  Lives with  in home with few steps  Independent with walking sticks    CURRENT FUNCTIONAL STATUS  · Manual Muscle Testing Results	grossly assessed due to  Upper and lower extremities grossly assessed.  Bilaterally, pt presents with a 3/5 on MMT score.    Transfer: Sit to Stand:     · Level of [place]	contact guard; Slight unsteadiness on feet.  · Physical Assist/Nonphysical Assist	1 person assist    Transfer: Stand to Sit:     · Level of [place]	contact guard  · Physical Assist/Nonphysical Assist	1 person assist    Gait Skills:     · Level of [place]	contact guard; slight unsteadiness on feet.  (Pt reports her falls are due to anxiety rather than balance/strength)  · Physical Assist/Nonphysical Assist	1 person assist  · Gait Distance	50 feet    FAMILY HISTORY   No pertinent family history in first degree relatives        RECENT LABS/IMAGING  CBC Full  -  ( 31 May 2023 06:30 )  WBC Count : 5.82 K/uL  RBC Count : 3.19 M/uL  Hemoglobin : 10.7 g/dL  Hematocrit : 32.9 %  Platelet Count - Automated : 157 K/uL  Mean Cell Volume : 103.1 fL  Mean Cell Hemoglobin : 33.5 pg  Mean Cell Hemoglobin Concentration : 32.5 gm/dL  Auto Neutrophil # : x  Auto Lymphocyte # : x  Auto Monocyte # : x  Auto Eosinophil # : x  Auto Basophil # : x  Auto Neutrophil % : x  Auto Lymphocyte % : x  Auto Monocyte % : x  Auto Eosinophil % : x  Auto Basophil % : x    05-31    128<L>  |  93<L>  |  4<L>  ----------------------------<  112<H>  4.5   |  24  |  0.53    Ca    9.6      31 May 2023 06:30  Phos  3.6     05-31  Mg     1.90     05-31    TPro  6.3  /  Alb  3.7  /  TBili  0.6  /  DBili  x   /  AST  83<H>  /  ALT  80<H>  /  AlkPhos  139<H>  05-30        VITALS  T(C): 36.8 (05-31-23 @ 05:50), Max: 37.1 (05-30-23 @ 12:00)  HR: 86 (05-31-23 @ 05:50) (78 - 87)  BP: 146/96 (05-31-23 @ 05:50) (110/62 - 148/93)  RR: 18 (05-31-23 @ 05:50) (16 - 18)  SpO2: 99% (05-31-23 @ 05:50) (97% - 100%)  Wt(kg): --    ALLERGIES  No Known Allergies      MEDICATIONS   cefTRIAXone   IVPB 1000 milliGRAM(s) IV Intermittent every 24 hours  enoxaparin Injectable 40 milliGRAM(s) SubCutaneous every 24 hours  losartan 50 milliGRAM(s) Oral daily  sodium chloride 0.9%. 1000 milliLiter(s) IV Continuous <Continuous>  thiamine IVPB 500 milliGRAM(s) IV Intermittent every 8 hours      ----------------------------------------------------------------------------------------  PHYSICAL EXAM  Constitutional - NAD, Comfortable  HEENT - NCAT, EOMI  Neck - Supple, No limited ROM  Chest - CTA bilaterally, No wheeze, No rhonchi, No crackles  Cardiovascular - RRR, S1S2, No murmurs  Abdomen - BS+, Soft, NTND  Extremities - No C/C/E, No calf tenderness   Neurologic Exam -                    Cognitive - Awake, Alert, AAO to self, place, date, year, situation     Communication - Fluent, No dysarthria     Cranial Nerves - CN 2-12 intact     Motor - No focal deficits                    LEFT    UE - ShAB 5/5, EF 5/5, EE 5/5, WE 5/5,  5/5                    RIGHT UE - ShAB 5/5, EF 5/5, EE 5/5, WE 5/5,  5/5                    LEFT    LE - HF 5/5, KE 5/5, DF 5/5, PF 5/5                    RIGHT LE - HF 5/5, KE 5/5, DF 5/5, PF 5/5        Sensory - Intact to LT     Reflexes - DTR Intact, No primitive reflexive     Coordination - FTN intact     OculoVestibular - No saccades, No nystagmus, VOR         Balance - WNL Static  Psychiatric - Mood stable, Affect WNL  ----------------------------------------------------------------------------------------  ASSESSMENT/PLAN    Pain -  DVT PPX -   Rehab -     incomplete note, consult in progress Patient is a 72y old  Female who presents with a chief complaint of Fall (31 May 2023 07:25)      HPI:  72F former smoker with PMH of hypertension, anxiety, and EtOH dependence, presents to ED with son complaining of frequent falls and gait instability.  Patient reports has had difficulty ambulating over the last month which has worsened in the last couple of days resulting in multiple falls.  Patient now newly ambulating with cane.  Patient states having extreme anxiety when ambulating, d/t fear of falling.  Per son patient has also had significant short-term memory loss over the last few weeks as well as tremors.  Patient also complaining of left foot paresthesias and Lt knee pain.  Patient reports has been daily drinker for years and has never stopped long enough to go through withdrawal. Denies history of seizures, intubation, hospitalization for withdrawal.  Patient's last drink was earlier this afternoon.  Additionally, patient notes 10lb weight loss over the past few weeks in setting of decreased appetite. Patient also denies fever/chills, cough, congestion, chest pain, shortness of breath, abdominal pain, nausea/vomiting/diarrhea, urinary symptoms, dizziness, weakness or any other symptoms at this time.    In the ED, patient was afebrile, HR , -156, RR 21 (satting 99% on RA). Labs notable for H/H 10.9/31.1, platelets 117, Na 122, Bicarb 21, Mag 1.2, Alk phos 167, AST//117, Tbili 2.5. XR L knee wnl. CTH with no acute changes. S/p 1L NS bolus. S/p IV ativan 2mg x 1, IV librium 50mg x 1 in ED.  (28 May 2023 05:37)    seen by PT on 5/28, ambulated cg   concern for wernicke's korsakoff, patient has had 2 falls over past few months, feels very limited by instability as well as anxiety.  states she was prescribed bupropion as outpatient but did not start it yet.    REVIEW OF SYSTEMS   weakness    PAST MEDICAL & SURGICAL HISTORY  HTN (hypertension)  Anxiety  EtOH dependence  No significant past surgical history      SOCIAL HISTORY  + etoh use    FUNCTIONAL HISTORY  Lives with  in home with few steps  Independent with walking sticks    CURRENT FUNCTIONAL STATUS  · Manual Muscle Testing Results	grossly assessed due to  Upper and lower extremities grossly assessed.  Bilaterally, pt presents with a 3/5 on MMT score.    Transfer: Sit to Stand:     · Level of [place]	contact guard; Slight unsteadiness on feet.  · Physical Assist/Nonphysical Assist	1 person assist    Transfer: Stand to Sit:     · Level of [place]	contact guard  · Physical Assist/Nonphysical Assist	1 person assist    Gait Skills:     · Level of [place]	contact guard; slight unsteadiness on feet.  (Pt reports her falls are due to anxiety rather than balance/strength)  · Physical Assist/Nonphysical Assist	1 person assist  · Gait Distance	50 feet    FAMILY HISTORY   No pertinent family history in first degree relatives        RECENT LABS/IMAGING  < from: CT Head No Cont (05.28.23 @ 01:12) >    ACC: [id number] EXAM:  CT BRAIN   ORDERED BY: MARIANN CHRISTENSEN DATE:  05/28/2023          INTERPRETATION:  INDICATIONS:  Status post fall.    TECHNIQUE:  Serial axial images were obtained from the skull base to the   vertex without intravenous contrast. Coronal and sagittal reformatted   images were obtained.    COMPARISON EXAMINATION: None.    FINDINGS:  VENTRICLES, SULCI AND BASAL CISTERNS:  Symmetric prominence of the   ventricles and sulci in the setting of cortical volume loss. Clear basal   cisterns.  INTRA-AXIAL:  No mass, hemorrhage, or vasogenic edema.  Mild patchy   hypodensities within the periventricular and subcortical white matter,   although nonspecific, likely reflect chronic microvascular disease.  EXTRA-AXIAL:  No mass or collection is seen.  VISUALIZED SINUSES:  Clear.  VISUALIZED MASTOIDS:  Clear.  CALVARIUM:  Normal.  ORBITS: Unremarkable.    IMPRESSION:  No acute intracranial hemorrhage or calvarial fracture.    --- End of Report ---          MASHA MANRIQUEZ MD; Resident Radiologist  This document has been electronically signed.  GUANAKO MAS MD; Attending Radiologist  This document has been electronically signed. May 28 2023  1:41AM    < end of copied text >    CBC Full  -  ( 31 May 2023 06:30 )  WBC Count : 5.82 K/uL  RBC Count : 3.19 M/uL  Hemoglobin : 10.7 g/dL  Hematocrit : 32.9 %  Platelet Count - Automated : 157 K/uL  Mean Cell Volume : 103.1 fL  Mean Cell Hemoglobin : 33.5 pg  Mean Cell Hemoglobin Concentration : 32.5 gm/dL  Auto Neutrophil # : x  Auto Lymphocyte # : x  Auto Monocyte # : x  Auto Eosinophil # : x  Auto Basophil # : x  Auto Neutrophil % : x  Auto Lymphocyte % : x  Auto Monocyte % : x  Auto Eosinophil % : x  Auto Basophil % : x    05-31    128<L>  |  93<L>  |  4<L>  ----------------------------<  112<H>  4.5   |  24  |  0.53    Ca    9.6      31 May 2023 06:30  Phos  3.6     05-31  Mg     1.90     05-31    TPro  6.3  /  Alb  3.7  /  TBili  0.6  /  DBili  x   /  AST  83<H>  /  ALT  80<H>  /  AlkPhos  139<H>  05-30        VITALS  T(C): 36.8 (05-31-23 @ 05:50), Max: 37.1 (05-30-23 @ 12:00)  HR: 86 (05-31-23 @ 05:50) (78 - 87)  BP: 146/96 (05-31-23 @ 05:50) (110/62 - 148/93)  RR: 18 (05-31-23 @ 05:50) (16 - 18)  SpO2: 99% (05-31-23 @ 05:50) (97% - 100%)  Wt(kg): --    ALLERGIES  No Known Allergies      MEDICATIONS   cefTRIAXone   IVPB 1000 milliGRAM(s) IV Intermittent every 24 hours  enoxaparin Injectable 40 milliGRAM(s) SubCutaneous every 24 hours  losartan 50 milliGRAM(s) Oral daily  sodium chloride 0.9%. 1000 milliLiter(s) IV Continuous <Continuous>  thiamine IVPB 500 milliGRAM(s) IV Intermittent every 8 hours      ----------------------------------------------------------------------------------------  PHYSICAL EXAM  Constitutional - NAD, Comfortable  HEENT - NCAT, EOMI  Neck - Supple, No limited ROM  Chest - CTA bilaterally, No wheeze, No rhonchi, No crackles  Cardiovascular - RRR, S1S2, No murmurs  Abdomen - BS+, Soft, NTND  Extremities - No C/C/E, No calf tenderness   Neurologic Exam -                    Cognitive - Awake, Alert, AAO to self, place, date, year, situation     Communication - Fluent, No dysarthria     Cranial Nerves - CN 2-12 intact     Motor - No focal deficits                    LEFT    UE - ShAB 5/5, EF 5/5, EE 5/5, WE 5/5,  5/5                    RIGHT UE - ShAB 5/5, EF 5/5, EE 5/5, WE 5/5,  5/5                    LEFT    LE - HF 5/5, KE 5/5, DF 5/5, PF 5/5                    RIGHT LE - HF 5/5, KE 5/5, DF 5/5, PF 5/5        Sensory - Intact to LT     Reflexes - DTR Intact, No primitive reflexive     Coordination - FTN intact     OculoVestibular - No saccades, No nystagmus, VOR         Balance - WNL Static  Psychiatric - Mood stable, Affect WNL  ----------------------------------------------------------------------------------------  ASSESSMENT/PLAN    Pain -  DVT PPX -   Rehab -     incomplete note, consult in progress Patient is a 72y old  Female who presents with a chief complaint of Fall (31 May 2023 07:25)      HPI:  72F former smoker with PMH of hypertension, anxiety, and EtOH dependence, presents to ED with son complaining of frequent falls and gait instability.  Patient reports has had difficulty ambulating over the last month which has worsened in the last couple of days resulting in multiple falls.  Patient now newly ambulating with cane.  Patient states having extreme anxiety when ambulating, d/t fear of falling.  Per son patient has also had significant short-term memory loss over the last few weeks as well as tremors.  Patient also complaining of left foot paresthesias and Lt knee pain.  Patient reports has been daily drinker for years and has never stopped long enough to go through withdrawal. Denies history of seizures, intubation, hospitalization for withdrawal.  Patient's last drink was earlier this afternoon.  Additionally, patient notes 10lb weight loss over the past few weeks in setting of decreased appetite. Patient also denies fever/chills, cough, congestion, chest pain, shortness of breath, abdominal pain, nausea/vomiting/diarrhea, urinary symptoms, dizziness, weakness or any other symptoms at this time.    In the ED, patient was afebrile, HR , -156, RR 21 (satting 99% on RA). Labs notable for H/H 10.9/31.1, platelets 117, Na 122, Bicarb 21, Mag 1.2, Alk phos 167, AST//117, Tbili 2.5. XR L knee wnl. CTH with no acute changes. S/p 1L NS bolus. S/p IV ativan 2mg x 1, IV librium 50mg x 1 in ED.  (28 May 2023 05:37)    seen by PT on 5/28, ambulated cg   concern for wernicke's korsakoff, patient has had 2 falls over past few months, feels very limited by instability as well as anxiety.  states she was prescribed bupropion as outpatient but did not start it yet.    REVIEW OF SYSTEMS   weakness    PAST MEDICAL & SURGICAL HISTORY  HTN (hypertension)  Anxiety  EtOH dependence  No significant past surgical history      SOCIAL HISTORY  + etoh use    FUNCTIONAL HISTORY  Lives with  in home with few steps  Independent with walking sticks    CURRENT FUNCTIONAL STATUS  · Manual Muscle Testing Results	grossly assessed due to  Upper and lower extremities grossly assessed.  Bilaterally, pt presents with a 3/5 on MMT score.    Transfer: Sit to Stand:     · Level of [place]	contact guard; Slight unsteadiness on feet.  · Physical Assist/Nonphysical Assist	1 person assist    Transfer: Stand to Sit:     · Level of [place]	contact guard  · Physical Assist/Nonphysical Assist	1 person assist    Gait Skills:     · Level of [place]	contact guard; slight unsteadiness on feet.  (Pt reports her falls are due to anxiety rather than balance/strength)  · Physical Assist/Nonphysical Assist	1 person assist  · Gait Distance	50 feet    FAMILY HISTORY   No pertinent family history in first degree relatives        RECENT LABS/IMAGING  < from: CT Head No Cont (05.28.23 @ 01:12) >    ACC: [id number] EXAM:  CT BRAIN   ORDERED BY: MARIANN CHRISTENSEN DATE:  05/28/2023          INTERPRETATION:  INDICATIONS:  Status post fall.    TECHNIQUE:  Serial axial images were obtained from the skull base to the   vertex without intravenous contrast. Coronal and sagittal reformatted   images were obtained.    COMPARISON EXAMINATION: None.    FINDINGS:  VENTRICLES, SULCI AND BASAL CISTERNS:  Symmetric prominence of the   ventricles and sulci in the setting of cortical volume loss. Clear basal   cisterns.  INTRA-AXIAL:  No mass, hemorrhage, or vasogenic edema.  Mild patchy   hypodensities within the periventricular and subcortical white matter,   although nonspecific, likely reflect chronic microvascular disease.  EXTRA-AXIAL:  No mass or collection is seen.  VISUALIZED SINUSES:  Clear.  VISUALIZED MASTOIDS:  Clear.  CALVARIUM:  Normal.  ORBITS: Unremarkable.    IMPRESSION:  No acute intracranial hemorrhage or calvarial fracture.    --- End of Report ---          MASHA MANRIQUEZ MD; Resident Radiologist  This document has been electronically signed.  GUANAKO MAS MD; Attending Radiologist  This document has been electronically signed. May 28 2023  1:41AM    < end of copied text >    CBC Full  -  ( 31 May 2023 06:30 )  WBC Count : 5.82 K/uL  RBC Count : 3.19 M/uL  Hemoglobin : 10.7 g/dL  Hematocrit : 32.9 %  Platelet Count - Automated : 157 K/uL  Mean Cell Volume : 103.1 fL  Mean Cell Hemoglobin : 33.5 pg  Mean Cell Hemoglobin Concentration : 32.5 gm/dL  Auto Neutrophil # : x  Auto Lymphocyte # : x  Auto Monocyte # : x  Auto Eosinophil # : x  Auto Basophil # : x  Auto Neutrophil % : x  Auto Lymphocyte % : x  Auto Monocyte % : x  Auto Eosinophil % : x  Auto Basophil % : x    05-31    128<L>  |  93<L>  |  4<L>  ----------------------------<  112<H>  4.5   |  24  |  0.53    Ca    9.6      31 May 2023 06:30  Phos  3.6     05-31  Mg     1.90     05-31    TPro  6.3  /  Alb  3.7  /  TBili  0.6  /  DBili  x   /  AST  83<H>  /  ALT  80<H>  /  AlkPhos  139<H>  05-30        VITALS  T(C): 36.8 (05-31-23 @ 05:50), Max: 37.1 (05-30-23 @ 12:00)  HR: 86 (05-31-23 @ 05:50) (78 - 87)  BP: 146/96 (05-31-23 @ 05:50) (110/62 - 148/93)  RR: 18 (05-31-23 @ 05:50) (16 - 18)  SpO2: 99% (05-31-23 @ 05:50) (97% - 100%)  Wt(kg): --    ALLERGIES  No Known Allergies      MEDICATIONS   cefTRIAXone   IVPB 1000 milliGRAM(s) IV Intermittent every 24 hours  enoxaparin Injectable 40 milliGRAM(s) SubCutaneous every 24 hours  losartan 50 milliGRAM(s) Oral daily  sodium chloride 0.9%. 1000 milliLiter(s) IV Continuous <Continuous>  thiamine IVPB 500 milliGRAM(s) IV Intermittent every 8 hours      ----------------------------------------------------------------------------------------  PHYSICAL EXAM  Constitutional - NAD, Comfortable  HEENT - NCAT, EOMI  Neck - Supple, No limited ROM  Chest - no respiratory distress  Cardiovascular - RRR, S1S2   Abdomen - BS+, Soft, NTND  Extremities - No C/C/E, No calf tenderness   Neurologic Exam -                    Cognitive - Awake, Alert, AAO to self, place, date, year, situation     Communication - Fluent, No dysarthria     Cranial Nerves - CN 2-12 intact     Motor -                      LEFT    UE - 4+/5                    RIGHT UE -4+/5                     LEFT    LE - 4+/5                     RIGHT LE - 4+/5      Sensory - Intact to LT      Balance - WNL Static  Psychiatric - Mood stable, Affect WNL  ----------------------------------------------------------------------------------------  ASSESSMENT/PLAN  73 yo f p/w fall, found to have UTI, 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

## 2023-05-31 NOTE — PROGRESS NOTE ADULT - SUBJECTIVE AND OBJECTIVE BOX
Patient is a 72y old  Female who presents with a chief complaint of Fall (30 May 2023 13:19)      SUBJECTIVE / OVERNIGHT EVENTS:    MEDICATIONS  (STANDING):  cefTRIAXone   IVPB 1000 milliGRAM(s) IV Intermittent every 24 hours  enoxaparin Injectable 40 milliGRAM(s) SubCutaneous every 24 hours  losartan 50 milliGRAM(s) Oral daily  sodium chloride 0.9%. 1000 milliLiter(s) (100 mL/Hr) IV Continuous <Continuous>  thiamine IVPB 500 milliGRAM(s) IV Intermittent every 8 hours    MEDICATIONS  (PRN):      CAPILLARY BLOOD GLUCOSE        I&O's Summary    30 May 2023 07:01  -  31 May 2023 07:00  --------------------------------------------------------  IN: 450 mL / OUT: 0 mL / NET: 450 mL        Vital Signs Last 24 Hrs  T(C): 36.8 (31 May 2023 05:50), Max: 37.1 (30 May 2023 08:00)  T(F): 98.2 (31 May 2023 05:50), Max: 98.8 (30 May 2023 12:00)  HR: 86 (31 May 2023 05:50) (78 - 90)  BP: 146/96 (31 May 2023 05:50) (110/62 - 148/93)  BP(mean): --  RR: 18 (31 May 2023 05:50) (16 - 18)  SpO2: 99% (31 May 2023 05:50) (97% - 100%)    Parameters below as of 31 May 2023 05:50  Patient On (Oxygen Delivery Method): room air        PHYSICAL EXAM:  GENERAL: NAD, well-developed  HEAD: Atraumatic, Normocephalic  EYES: EOMI, PERRLA, conjunctiva and sclera clear  NECK: Supple, No JVD  CHEST/LUNG: Clear to auscultation bilaterally; No wheezes or crackles  HEART: Normal S1/S2; Regular rate and rhythm; No murmurs, rubs, or gallops  ABDOMEN: Soft, Nontender, Nondistended; Bowel sounds present  EXTREMITIES: 2+ Peripheral Pulses; No clubbing, cyanosis, or edema  PSYCH: A&Ox3  NEUROLOGY: no focal neurologic deficit  SKIN: No rashes or lesions    LABS:                        10.7   5.82  )-----------( 157      ( 31 May 2023 06:30 )             32.9      05-31    128<L>  |  93<L>  |  4<L>  ----------------------------<  112<H>  4.5   |  24  |  0.53    Ca    9.6      31 May 2023 06:30  Phos  3.6     05-31  Mg     1.90     05-31    TPro  6.3  /  Alb  3.7  /  TBili  0.6  /  DBili  x   /  AST  83<H>  /  ALT  80<H>  /  AlkPhos  139<H>  05-30              RADIOLOGY & ADDITIONAL TESTS:    Imaging Personally Reviewed:    Consultant(s) Notes Reviewed:      Care Discussed with Consultants/Other Providers:   Patient is a 72y old  Female who presents with a chief complaint of Fall (30 May 2023 13:19)      SUBJECTIVE / OVERNIGHT EVENTS:    Patient confused about "appointment" this am.  Wants to go home.    MEDICATIONS  (STANDING):  cefTRIAXone   IVPB 1000 milliGRAM(s) IV Intermittent every 24 hours  enoxaparin Injectable 40 milliGRAM(s) SubCutaneous every 24 hours  losartan 50 milliGRAM(s) Oral daily  sodium chloride 0.9%. 1000 milliLiter(s) (100 mL/Hr) IV Continuous <Continuous>  thiamine IVPB 500 milliGRAM(s) IV Intermittent every 8 hours    MEDICATIONS  (PRN):      CAPILLARY BLOOD GLUCOSE        I&O's Summary    30 May 2023 07:01  -  31 May 2023 07:00  --------------------------------------------------------  IN: 450 mL / OUT: 0 mL / NET: 450 mL        Vital Signs Last 24 Hrs  T(C): 36.8 (31 May 2023 05:50), Max: 37.1 (30 May 2023 08:00)  T(F): 98.2 (31 May 2023 05:50), Max: 98.8 (30 May 2023 12:00)  HR: 86 (31 May 2023 05:50) (78 - 90)  BP: 146/96 (31 May 2023 05:50) (110/62 - 148/93)  BP(mean): --  RR: 18 (31 May 2023 05:50) (16 - 18)  SpO2: 99% (31 May 2023 05:50) (97% - 100%)    Parameters below as of 31 May 2023 05:50  Patient On (Oxygen Delivery Method): room air        PHYSICAL EXAM:  GENERAL: alert, anxious  HEAD: Atraumatic, Normocephalic  EYES: EOMI, PERRLA, conjunctiva and sclera clear, no nystagmus  NECK: Supple, No JVD  CHEST/LUNG: Clear to auscultation bilaterally; No wheezes or crackles  HEART: Normal S1/S2; Regular rate and rhythm; No murmurs, rubs, or gallops  ABDOMEN: Soft, Nontender, Nondistended; Bowel sounds present  EXTREMITIES: 2+ Peripheral Pulses; No clubbing, cyanosis, or edema, no asterixis  PSYCH: A&Ox3  NEUROLOGY: symmetric facial muscles, 4/5-5/5 b/l strength in UE and LE, normal sensation to pinprick and light touch in b/l LE, midline tongue, +intention tremor on finger-to-nose, +fine tremors in right hand, patient able to ambulate with assistance, +wide based gait  SKIN: No rashes or lesions  LABS:                        10.7   5.82  )-----------( 157      ( 31 May 2023 06:30 )             32.9      05-31    128<L>  |  93<L>  |  4<L>  ----------------------------<  112<H>  4.5   |  24  |  0.53    Ca    9.6      31 May 2023 06:30  Phos  3.6     05-31  Mg     1.90     05-31    TPro  6.3  /  Alb  3.7  /  TBili  0.6  /  DBili  x   /  AST  83<H>  /  ALT  80<H>  /  AlkPhos  139<H>  05-30              RADIOLOGY & ADDITIONAL TESTS:    Imaging Personally Reviewed:    Consultant(s) Notes Reviewed:      Care Discussed with Consultants/Other Providers:

## 2023-05-31 NOTE — SBIRT NOTE ADULT - NSSBIRTALCPOSREINDET_GEN_A_CORE
SBIRT completed with the patient. Patient spouse was in the room, patient insisted the spouse remain in the room for the assessment.   Patient endorsed drinking 2-3 shots of vodka seltzer daily at night. Patient reports she has been doing this since she was a teenager and does not see her drinking habits as an issue. Patient declined all resources and referrals at this time. No further SW intervention needed at this time.

## 2023-05-31 NOTE — PROGRESS NOTE ADULT - PROBLEM SELECTOR PLAN 1
Pt reports gait instability over past few months with several assocated falls. Likely alcohol-related nutritional deficiency vs cerebellar CVA vs. electrolyte abnormality  -B12 wnl  -thiamine supplementation 500 tid, followed by 250 qd x3d  -CTH wnl   -Fall risk protocol   -PT rec RW  -MRI head outpt on 6/19 Pt reports gait instability over past few months with several assocated falls. Likely alcohol-related nutritional deficiency vs cerebellar CVA vs. electrolyte abnormality  -B12 wnl  -thiamine supplementation 500 tid, followed by 250 qd x3d  -CTH wnl   -Fall risk protocol   -PT rec RW, PM&R consulted for rehab potential  -MRI head outpt on 6/19

## 2023-05-31 NOTE — PROGRESS NOTE ADULT - PROBLEM SELECTOR PLAN 3
Na 122 on presentation. Likely beer potomania vs tea&toast diet.   -S/p 1L NS bolus in ED, continue NS 100ccs/hour   -BMP q6h  -Urine lytes  -Monitor I/Os   -Nutrition consult, appreciate recs

## 2023-06-01 ENCOUNTER — TRANSCRIPTION ENCOUNTER (OUTPATIENT)
Age: 73
End: 2023-06-01

## 2023-06-01 ENCOUNTER — INPATIENT (INPATIENT)
Facility: HOSPITAL | Age: 73
LOS: 7 days | Discharge: HOME | DRG: 949 | End: 2023-06-09
Attending: PHYSICAL MEDICINE & REHABILITATION | Admitting: PHYSICAL MEDICINE & REHABILITATION
Payer: MEDICARE

## 2023-06-01 VITALS
TEMPERATURE: 98 F | HEART RATE: 76 BPM | SYSTOLIC BLOOD PRESSURE: 144 MMHG | OXYGEN SATURATION: 98 % | DIASTOLIC BLOOD PRESSURE: 84 MMHG | RESPIRATION RATE: 17 BRPM

## 2023-06-01 VITALS
HEIGHT: 62 IN | DIASTOLIC BLOOD PRESSURE: 80 MMHG | SYSTOLIC BLOOD PRESSURE: 146 MMHG | HEART RATE: 78 BPM | WEIGHT: 144.62 LBS | TEMPERATURE: 98 F | RESPIRATION RATE: 15 BRPM | OXYGEN SATURATION: 96 %

## 2023-06-01 DIAGNOSIS — R53.81 OTHER MALAISE: ICD-10-CM

## 2023-06-01 PROBLEM — F41.9 ANXIETY DISORDER, UNSPECIFIED: Chronic | Status: ACTIVE | Noted: 2023-05-28

## 2023-06-01 PROBLEM — F10.20 ALCOHOL DEPENDENCE, UNCOMPLICATED: Chronic | Status: ACTIVE | Noted: 2023-05-28

## 2023-06-01 PROBLEM — I10 ESSENTIAL (PRIMARY) HYPERTENSION: Chronic | Status: ACTIVE | Noted: 2023-05-28

## 2023-06-01 LAB
ANION GAP SERPL CALC-SCNC: 11 MMOL/L — SIGNIFICANT CHANGE UP (ref 7–14)
ANION GAP SERPL CALC-SCNC: 13 MMOL/L — SIGNIFICANT CHANGE UP (ref 7–14)
BUN SERPL-MCNC: 5 MG/DL — LOW (ref 7–23)
BUN SERPL-MCNC: 5 MG/DL — LOW (ref 7–23)
CALCIUM SERPL-MCNC: 10 MG/DL — SIGNIFICANT CHANGE UP (ref 8.4–10.5)
CALCIUM SERPL-MCNC: 9.6 MG/DL — SIGNIFICANT CHANGE UP (ref 8.4–10.5)
CHLORIDE SERPL-SCNC: 97 MMOL/L — LOW (ref 98–107)
CHLORIDE SERPL-SCNC: 98 MMOL/L — SIGNIFICANT CHANGE UP (ref 98–107)
CO2 SERPL-SCNC: 22 MMOL/L — SIGNIFICANT CHANGE UP (ref 22–31)
CO2 SERPL-SCNC: 22 MMOL/L — SIGNIFICANT CHANGE UP (ref 22–31)
CREAT SERPL-MCNC: 0.56 MG/DL — SIGNIFICANT CHANGE UP (ref 0.5–1.3)
CREAT SERPL-MCNC: 0.57 MG/DL — SIGNIFICANT CHANGE UP (ref 0.5–1.3)
EGFR: 98 ML/MIN/1.73M2 — SIGNIFICANT CHANGE UP
EGFR: 98 ML/MIN/1.73M2 — SIGNIFICANT CHANGE UP
GLUCOSE SERPL-MCNC: 162 MG/DL — HIGH (ref 70–99)
GLUCOSE SERPL-MCNC: 93 MG/DL — SIGNIFICANT CHANGE UP (ref 70–99)
HCT VFR BLD CALC: 31.7 % — LOW (ref 34.5–45)
HGB BLD-MCNC: 10.3 G/DL — LOW (ref 11.5–15.5)
MAGNESIUM SERPL-MCNC: 1.8 MG/DL — SIGNIFICANT CHANGE UP (ref 1.6–2.6)
MAGNESIUM SERPL-MCNC: 2 MG/DL — SIGNIFICANT CHANGE UP (ref 1.6–2.6)
MCHC RBC-ENTMCNC: 32.5 GM/DL — SIGNIFICANT CHANGE UP (ref 32–36)
MCHC RBC-ENTMCNC: 34.4 PG — HIGH (ref 27–34)
MCV RBC AUTO: 106 FL — HIGH (ref 80–100)
NRBC # BLD: 0 /100 WBCS — SIGNIFICANT CHANGE UP (ref 0–0)
NRBC # FLD: 0 K/UL — SIGNIFICANT CHANGE UP (ref 0–0)
PHOSPHATE SERPL-MCNC: 4.1 MG/DL — SIGNIFICANT CHANGE UP (ref 2.5–4.5)
PHOSPHATE SERPL-MCNC: 5.1 MG/DL — HIGH (ref 2.5–4.5)
PLATELET # BLD AUTO: 140 K/UL — LOW (ref 150–400)
POTASSIUM SERPL-MCNC: 4.9 MMOL/L — SIGNIFICANT CHANGE UP (ref 3.5–5.3)
POTASSIUM SERPL-MCNC: 6.2 MMOL/L — CRITICAL HIGH (ref 3.5–5.3)
POTASSIUM SERPL-SCNC: 4.9 MMOL/L — SIGNIFICANT CHANGE UP (ref 3.5–5.3)
POTASSIUM SERPL-SCNC: 6.2 MMOL/L — CRITICAL HIGH (ref 3.5–5.3)
RBC # BLD: 2.99 M/UL — LOW (ref 3.8–5.2)
RBC # FLD: 13.7 % — SIGNIFICANT CHANGE UP (ref 10.3–14.5)
SODIUM SERPL-SCNC: 131 MMOL/L — LOW (ref 135–145)
SODIUM SERPL-SCNC: 132 MMOL/L — LOW (ref 135–145)
WBC # BLD: 4.03 K/UL — SIGNIFICANT CHANGE UP (ref 3.8–10.5)
WBC # FLD AUTO: 4.03 K/UL — SIGNIFICANT CHANGE UP (ref 3.8–10.5)

## 2023-06-01 PROCEDURE — 99232 SBSQ HOSP IP/OBS MODERATE 35: CPT

## 2023-06-01 PROCEDURE — 99239 HOSP IP/OBS DSCHRG MGMT >30: CPT

## 2023-06-01 RX ORDER — ACETAMINOPHEN 500 MG
650 TABLET ORAL ONCE
Refills: 0 | Status: COMPLETED | OUTPATIENT
Start: 2023-06-01 | End: 2023-06-01

## 2023-06-01 RX ORDER — THIAMINE MONONITRATE (VIT B1) 100 MG
100 TABLET ORAL DAILY
Refills: 0 | Status: DISCONTINUED | OUTPATIENT
Start: 2023-06-01 | End: 2023-06-09

## 2023-06-01 RX ORDER — ENOXAPARIN SODIUM 100 MG/ML
40 INJECTION SUBCUTANEOUS EVERY 24 HOURS
Refills: 0 | Status: DISCONTINUED | OUTPATIENT
Start: 2023-06-01 | End: 2023-06-09

## 2023-06-01 RX ORDER — FOLIC ACID 0.8 MG
1 TABLET ORAL DAILY
Refills: 0 | Status: DISCONTINUED | OUTPATIENT
Start: 2023-06-01 | End: 2023-06-09

## 2023-06-01 RX ORDER — LOPERAMIDE HCL 2 MG
2 TABLET ORAL EVERY 6 HOURS
Refills: 0 | Status: DISCONTINUED | OUTPATIENT
Start: 2023-06-01 | End: 2023-06-09

## 2023-06-01 RX ORDER — LOSARTAN POTASSIUM 100 MG/1
50 TABLET, FILM COATED ORAL DAILY
Refills: 0 | Status: DISCONTINUED | OUTPATIENT
Start: 2023-06-01 | End: 2023-06-08

## 2023-06-01 RX ADMIN — Medication 105 MILLIGRAM(S): at 06:14

## 2023-06-01 RX ADMIN — Medication 105 MILLIGRAM(S): at 16:43

## 2023-06-01 RX ADMIN — LOSARTAN POTASSIUM 50 MILLIGRAM(S): 100 TABLET, FILM COATED ORAL at 06:14

## 2023-06-01 RX ADMIN — Medication 650 MILLIGRAM(S): at 11:58

## 2023-06-01 RX ADMIN — Medication 650 MILLIGRAM(S): at 10:58

## 2023-06-01 RX ADMIN — ENOXAPARIN SODIUM 40 MILLIGRAM(S): 100 INJECTION SUBCUTANEOUS at 08:50

## 2023-06-01 NOTE — DISCHARGE NOTE NURSING/CASE MANAGEMENT/SOCIAL WORK - NSDCPEFALRISK_GEN_ALL_CORE
For information on Fall & Injury Prevention, visit: [email or web address] OR  [email or web address] OR  https://www.cdc.gov/steadi/patient.html

## 2023-06-01 NOTE — PROGRESS NOTE ADULT - PROBLEM SELECTOR PLAN 7
-150 in ED.   -C/w irbesartan 150 equivalent

## 2023-06-01 NOTE — PROGRESS NOTE ADULT - PROBLEM SELECTOR PLAN 1
Pt reports gait instability over past few months with several assocated falls. Likely alcohol-related nutritional deficiency vs cerebellar CVA vs. electrolyte abnormality  -B12 wnl  -thiamine supplementation 500 tid, followed by 250 qd x3d  -CTH wnl   -Fall risk protocol   -PT rec RW, PM&R consulted for rehab potential  -MRI head outpt on 6/19

## 2023-06-01 NOTE — H&P ADULT - NSHPREVIEWOFSYSTEMS_GEN_ALL_CORE
REVIEW OF SYSTEMS  Constitutional: No fever, No Chills, No fatigue  HEENT: No eye pain, No visual disturbances, No difficulty hearing  Pulm: No cough,  No shortness of breath  Cardio: No chest pain, No palpitations  GI:  No abdominal pain, No nausea, No vomiting, No diarrhea, No constipation  : No dysuria, No frequency, No hematuria  Neuro: No headaches, No memory loss, + loss of strength, + numbness, No tremors  Skin: No itching, No rashes, No lesions   Endo: No temperature intolerance  MSK: No joint pain, No joint swelling, No muscle pain, No Neck pain,  No back pain  Psych:  No depression, No anxiety Constitutional: No fever or chills. Malaise, dizziness   HEENT: Denies eye pain, visual disturbances, difficulty hearing, dysphagia   Pulm: No cough or SOB  Cardio: No chest pain or palpations   GI:  No abdominal pain, n/v/d. Moving bowels regularly   : No dysuria, No hematuria or incontinence  Neuro: Headaches, memory loss, tremors   Skin: No itching, rashes or lesions   Endo: No temperature intolerance  MSK: R shoulder pain (chronic) but no recent arthropathics sxs   Psych:  Anxiety on admission, OSH improved

## 2023-06-01 NOTE — H&P ADULT - HISTORY OF PRESENT ILLNESS
72F former smoker with PMH of hypertension, anxiety, and EtOH dependence (drinks 8-10 oz vodka daily for 40+ years), admitted for gait instability and recurrent falls for past 6 months. CTH wnl, BMP notable for hyponatremia to 122 likely 2/2 "beer potomania."  Patient was placed on CIWA and IVF with continuous NS.  Patient seen by PT, who recommended rolling walker.  Patient not interested in stopping drinking because she likes it.  Course also c/b UTI with Kleb pneumo treated with ceftriaxone x3d.      On admission, patient noted to have symptoms c/f Wernicke-Korsakoff: lateral nystagmus (with rightward gaze), disorientation (was not aware that she had been staying in same room the entire admission), and wide-based ataxia.  Also noted to have confabulations (would describe coordinating events with nighttime nursing staff) and impaired short term memory (forgot conversations, people, etc from day prior).  Patient was given IV thiamine, B9, B12, multivitamins, and electrolytes repleted.  CTH negative.    Patient medically optimized for discharge to acute inpatient rehabilitation.       Med Reconciliation:   This is a 73 YO F  with PMH of HTN, anxiety, and EtOH dependence (drinks 8-10 oz vodka daily for 40+ years), admitted for gait instability and recurrent falls for past 6 months. CTH wnl, BMP notable for hyponatremia to 122 likely 2/2 "beer potomania."  Patient was placed on CIWA and IVF with continuous NS.  Patient seen by PT, who recommended rolling walker. Patient not interested in stopping drinking because she likes it.  Course also c/b UTI with Kleb pneumo treated with ceftriaxone x3d.      On admission, patient noted to have symptoms c/f Wernicke-Korsakoff: lateral nystagmus (with rightward gaze), disorientation (was not aware that she had been staying in same room the entire admission), and wide-based ataxia.  Also noted to have confabulations (would describe coordinating events with nighttime nursing staff) and impaired short term memory (forgot conversations, people, etc from day prior).  Patient was given IV thiamine, B9, B12, multivitamins, and electrolytes repleted. CTH negative.    Patient was evaluated by PM&R and therapy for functional deficits, gait/ADL impairments and acute rehabilitation was recommended. Patient was medically optimized for discharge to [facility] IRU on 6/1/23.   This is a 71 YO F  with PMH of HTN, anxiety, and EtOH dependence (drinks 8-10 oz vodka daily for 40+ years), admitted for gait instability and recurrent falls for past 6 months. CTH wnl, BMP notable for hyponatremia to 122 likely 2/2 "beer potomania."  Patient was placed on CIWA and IVF with continuous NS. Patient seen by PT, who recommended rolling walker. Patient not interested in stopping drinking because she likes it.  Course also c/b UTI with Kleb pneumo treated with ceftriaxone x3d.      On admission, patient noted to have symptoms c/f Wernicke-Korsakoff: lateral nystagmus (with rightward gaze), disorientation (was not aware that she had been staying in same room the entire admission), and wide-based ataxia.  Also noted to have confabulations (would describe coordinating events with nighttime nursing staff) and impaired short term memory (forgot conversations, people, etc from day prior).  Patient was given IV thiamine, B9, B12, multivitamins, and electrolytes repleted. CTH negative.    Patient was evaluated by PM&R and therapy for functional deficits, gait/ADL impairments and acute rehabilitation was recommended. Patient was medically optimized for discharge to [facility] IRU on 6/1/23.

## 2023-06-01 NOTE — PROGRESS NOTE ADULT - ATTENDING COMMENTS
Patient seen and examined this morning. Patient's spouse Bunny at bedside. Patient eager to go to rehab today. No acute complaints.    #EtOH withdrawal- out of window period, CIWA d/norris. C/w thiamine, MV, folic acid. May have baseline forgetfulness in setting of chronic EtOH use. Pt scheduled for outpatient MRI head on June 19. No need for inpatient MRI. CTH on admission with no acute changes. SBIRT consult.    #UTI- urine culture growing Klebsiella. Completed 3 days of CTX.    #Hyponatremia- in setting of chronic EtOH use. Improved with IVF.    #IBS-D- started on Imodium PRN.    PM&R eval recommending acute rehab. SW following for placement.    d/w Dr. Tiwari
Patient seen and examined this morning. Patient's spouse Bunny at bedside. Thinks patient looks to be doing better than yesterday. Patient does not remember that writer saw her yesterday or that she was in the same room yesterday. Amenable to going to rehab. States she feels shaky when walking by herself.    #EtOH withdrawal- out of window period, CIWA d/norris. C/w thiamine, MV, folic acid. May have baseline forgetfulness in setting of chronic EtOH use. Pt scheduled for outpatient MRI head on June 19. No need for inpatient MRI. CTH on admission with no acute changes. SBIRT consult.    #UTI- urine culture growing Klebsiella. Completed 3 days of CTX.    #Hyponatremia- in setting of chronic EtOH use. Improved with IVF, now worsening to 128- will c/w IVF. Check urine sodium and osmolality.    #IBS-D- started on Imodium PRN.    PM&R eval recommending acute rehab. SW following for placement.    d/w Dr. Tiwari
Patient seen and examined this afternoon. Patient states she will stay in the hospital one more day, says her daughter in law is working on getting her into rehab for strength training. We started to discuss the importance of cutting back on alcohol use, and patient stated "I don't want to hear anything about that, I will cut back but not stop." Per RN, patient's behavior more erratic today and patient appears to be more forgetful than usual.    #EtOH withdrawal- monitor CIWA, c/w symptom-triggered Ativan. C/w thiamine, MV, folic acid. May have baseline forgetfulness in setting of chronic EtOH use. Pt scheduled for outpatient MRI head on June 19. No need for inpatient MRI. CTH on admission with no acute changes. SBIRT consult.    #UTI- urine culture growing Klebsiella. C/w CTX empirically. F/u sensitivities.    #Hyponatremia- in setting of chronic EtOH use. Improved with IVF.    PT reassessment pending.    d/w Dr. Tiwari
71 yo female with pmhx of HTN, anxiety, alcohol use disorder brought in for gait instability and frequent falls. noted to have long standing alcohol use disorder and intoxication during falls, additionally noted to have chronic hyponatremia. Pt reports hx of alcohol use, last drink on the day of admission. reports 4 ounces of vodka daily, per son pt drinks more than 4 ounces. denies prior hx of withdrawal as pt never went without drinking. No acute signs of withdrawal noted. On exam, vss. no tongue fasciculations, no tremors. abd soft, non-tender, no stigmata of cirrhosis.     #Fall  #gait impairment  denies dizziness, no nausea/vomiting, no focal ext weakness. CT Head with no acute findings. falls likely related to alcohol intoxication and thiamine deficiency dt chronic alcohol use.   - pt eval  - can obtain MRI head while in hospital (can also be deferred outpatient)  - advise alcohol abstinence  - high dose thiamine, folic acid, mvi    #etoh use disorder  likely still intoxicated, last drink on the day of admission.   fu etoh level  no prior hx of withdrawal, pt is a high risk for withdrawal   -monitor ciwa and ativan prn    #Hyponatremia, likely chronic, hypovolemic. pt reports not eating well for a long time as she has been drinking alcohol.   - s/p 1l ns Bolus, increase NS from 50 cc to 100cc/hr  - avoid over correction, monitor bmp q8hr  - encourage po intake    #Hypophosphatemia  #Hypokalemia  #Hypomagnesium  dt poor po intake and hypopho likely dt refeeding   replete prn, monitor BMP q8hr     #Transaminitis  abd us with hepatic steatosis, no cirrhosis.   monitor lfts  hep panel- wnl    rest as above
71 yo female with pmhx of HTN, anxiety, alcohol use disorder brought in for gait instability and frequent falls. noted to have long standing alcohol use disorder and intoxication during falls, additionally noted to have chronic hyponatremia.     Pt reports hx of alcohol use, last drink on the day of admission. reports 4 ounces of vodka daily, per son pt drinks more than 4 ounces. denies prior hx of withdrawal as pt never went without drinking. No acute signs of withdrawal noted. On exam, vss. no tongue fasciculations, no tremors. abd soft, non-tender, no stigmata of cirrhosis.     #Fall  #gait impairment  denies dizziness, no nausea/vomiting, no focal ext weakness. CT Head with no acute findings. falls likely related to alcohol intoxication and thiamine deficiency dt chronic alcohol use.   - pt eval  - can obtain MRI head while in hospital   - advise alcohol abstinence  - high dose thiamine, folic acid, mvi    #etoh use disorder  likely still intoxicated  fu etoh level  no prior hx of withdrawal, pt is a high risk   -monitor ciwa and ativan prn    #Hyponatremia, likely chronic, hypovolemic. pt reports not eating well for a long time as she has been drinking alcohol.   - s/p 1l ns Bolus.   - fu urine studies  - gentle NS IVF maintenance, 50cc/hr for 10hr, avoid over correction, monitor bmp q8hr  - encourage po intake    #Hypophosphatemia  #Hypokalemia  #Hypomagnesium  dt poor po intake  replete prn    #Transaminitis  abd us with hepatic steatosis, no cirrhosis.   monitor lfts  fu hep panel    rest as above

## 2023-06-01 NOTE — PROGRESS NOTE ADULT - SUBJECTIVE AND OBJECTIVE BOX
Patient is a 72y old  Female who presents with a chief complaint of Fall (31 May 2023 10:38)      SUBJECTIVE / OVERNIGHT EVENTS:    MEDICATIONS  (STANDING):  enoxaparin Injectable 40 milliGRAM(s) SubCutaneous every 24 hours  losartan 50 milliGRAM(s) Oral daily  sodium chloride 0.9%. 1000 milliLiter(s) (100 mL/Hr) IV Continuous <Continuous>  thiamine IVPB 500 milliGRAM(s) IV Intermittent every 8 hours    MEDICATIONS  (PRN):  loperamide 2 milliGRAM(s) Oral every 6 hours PRN Diarrhea      CAPILLARY BLOOD GLUCOSE        I&O's Summary    31 May 2023 07:01  -  01 Jun 2023 07:00  --------------------------------------------------------  IN: 0 mL / OUT: 1300 mL / NET: -1300 mL        Vital Signs Last 24 Hrs  T(C): 36.9 (01 Jun 2023 06:12), Max: 36.9 (01 Jun 2023 06:12)  T(F): 98.5 (01 Jun 2023 06:12), Max: 98.5 (01 Jun 2023 06:12)  HR: 71 (01 Jun 2023 06:12) (71 - 84)  BP: 152/89 (01 Jun 2023 06:12) (134/81 - 152/89)  BP(mean): --  RR: 17 (01 Jun 2023 06:12) (17 - 18)  SpO2: 100% (01 Jun 2023 06:12) (99% - 100%)    Parameters below as of 01 Jun 2023 06:12  Patient On (Oxygen Delivery Method): room air        PHYSICAL EXAM:  GENERAL: NAD, well-developed  HEAD: Atraumatic, Normocephalic  EYES: EOMI, PERRLA, conjunctiva and sclera clear  NECK: Supple, No JVD  CHEST/LUNG: Clear to auscultation bilaterally; No wheezes or crackles  HEART: Normal S1/S2; Regular rate and rhythm; No murmurs, rubs, or gallops  ABDOMEN: Soft, Nontender, Nondistended; Bowel sounds present  EXTREMITIES: 2+ Peripheral Pulses; No clubbing, cyanosis, or edema  PSYCH: A&Ox3  NEUROLOGY: no focal neurologic deficit  SKIN: No rashes or lesions    LABS:                        10.3   4.03  )-----------( 140      ( 01 Jun 2023 05:48 )             31.7      06-01    131<L>  |  98  |  5<L>  ----------------------------<  93  6.2<HH>   |  22  |  0.57    Ca    9.6      01 Jun 2023 05:48  Phos  5.1     06-01  Mg     2.00     06-01                RADIOLOGY & ADDITIONAL TESTS:    Imaging Personally Reviewed:    Consultant(s) Notes Reviewed:      Care Discussed with Consultants/Other Providers:

## 2023-06-01 NOTE — PROGRESS NOTE ADULT - ASSESSMENT
72F former smoker with PMH of hypertension, anxiety, and EtOH dependence, presents to ED with son complaining of frequent falls and gait instability.  Patient reports has had difficulty ambulating over the last month which has worsened in the last couple of days resulting in multiple falls. A/w concern for alcohol withdrawal, hyponatremia, and w/u of gait instability.  MRI head pending, but stroke unlikely given symptoms; will d/c and have patient obtain 6/19 MRI head as scheduled for outpt.  >48 hours since last drink, CIWA <3    Last Na 128, restarted IVF at 100ccs/hour  concerned for Wernicke's korsakoff (on admission, with nystagmus on visualizing R lateral field, wide-based gait, impaired finger-to-nose, disorientation, impaired short-term memory), will continue nutritional supplementation and supportive care.

## 2023-06-01 NOTE — PROGRESS NOTE ADULT - PROBLEM SELECTOR PLAN 8
Pt prescribed bupropion as outpatient but hasn't started it yet.   -Continue to monitor

## 2023-06-01 NOTE — PATIENT PROFILE ADULT - FALL HARM RISK - HARM RISK INTERVENTIONS

## 2023-06-01 NOTE — OCCUPATIONAL THERAPY INITIAL EVALUATION ADULT - PERTINENT HX OF CURRENT PROBLEM, REHAB EVAL
72 year old female with history of hypertension, anxiety, and EtOH dependence, presents to ED complaining of frequent falls and gait instability. Found to have hyponatremia. CT head negative for acute abnormality.

## 2023-06-01 NOTE — H&P ADULT - NSHPPHYSICALEXAM_GEN_ALL_CORE
Constitutional - NAD, Comfortable  HEENT - NCAT, EOMI  Neck - Supple, No limited ROM  Chest - no respiratory distress  Cardiovascular - RRR, S1S2   Abdomen - BS+, Soft, NTND  Extremities - No C/C/E, No calf tenderness   Neurologic Exam -                    Cognitive - Awake, Alert, AAO to self, place, date, year, situation     Communication - Fluent, No dysarthria     Cranial Nerves - CN 2-12 intact     Motor -                      LEFT    UE - 4+/5                    RIGHT UE -4+/5                     LEFT    LE - 4+/5                     RIGHT LE - 4+/5      Sensory - Intact to LT      Balance - WNL Static  Psychiatric - Mood stable, Affect WNL Constitutional - NAD, Comfortable, Affable   HEENT - NCAT, EOMI  Chest - Respirations non-distressed, lungs CTAB  Cardio - RRR, no murmur, no peripheral edema   Abdomen -  Soft, NTND  Neurologic Exam:                    Cognitive -             Orientation: Awake, Alert, AAO to self, place, date, year, situation            Attention:  Days of week, recall 2/3 objects without cuing            Memory: Recent/ remote memory grossly intact            Thought: Content appropriate, process fairly tangential      Speech - Mild dysarthria, no aphasia      Cranial Nerves - No facial asymmetry, Tongue midline, EOMI, Shoulder shrug intact     Motor -    ---- Ankles were initially locked up when prompted on DF/PF. Ranging elicits a ?[id number]? in dorsiflexors. After PROM, range improved and strength returned to normal    ---- Fine tremors noted in hands at rest                     LEFT    UE - ShAB 4/5, EF 5/5, EE 4/5, WE 5/5,  WNL                    RIGHT UE - ShAB 4/5, EF 5/5, EE 4/5, WE 5/5,  WNL                    LEFT    LE - HF 5/5, KE 5/5, DF 5/5, PF 5/5                    RIGHT LE - HF 5/5, KE 5/5, DF 5/5, PF 5/5        Sensory - Intact to LT bilateral     Reflexes - Not elicited in lower extremities. Upper extremities wnl.      Coordination - Dysmetria      OculoVestibular -  + horizontal nystagmus  Psychiatric - Affect wnl   Skin on admission: Unremarkable

## 2023-06-01 NOTE — H&P ADULT - ASSESSMENT
ASSESSMENT/PLAN  This is a   ___ year-old ___ with a PMH of _.  Patient now with gait Instability, ADL impairments and Functional impairments.    #  - Start Comprehensive Rehab Program: PT/OT/ST, 3hours daily and 5 days weekly  - PT: Focused on improving strength, endurance, coordination, balance, functional mobility, and transfers  - OT: Focused on improving strength, fine motor skills, coordination, posture and ADLs.    - ST: to diagnose and treat deficits in swallowing, cognition and communication.   - WB Status:  - Prosthetic and Othortic as needed    #CVA  -    #HTN  -    #HLD  -    #DM II  - ISS and FS  - Admelog and Lantus  - A1c ..... on     #A-fib  -    #Pain management  - Tylenol PRN, Oxycodone PRN    #DVT ppx  - Lovenox, Heparin, SCD, TEDs    #GI ppx  - Pepcid 20mg , Nexium, Protonix 40mg    #Bowel Regimen  - Senna, miralax PRN    #Bladder management  - BS on admission, and q 8 hours (SC if > 400)  - Monitor UO    #FEN   - Diet:  - Supplements:    #Skin:  - No active issues at this time  - Skin on admission: ***  - Pressure injury/Skin: Turn Q2hrs while in bed, OOB to Chair, PT/OT     #Dysphagia    - SLP: evaluation and treatment    #Mood/Cognition:  - Neuropsychology consult PRN    #Sleep:   - Maintain quiet hours and low stim environment.  - Melatonin PRN to maximize participation in therapy during the day.   - Monitor sleep logs    #Precaution  - Fall, Aspiration, cardiac, Sternal, Spinal, Seizure, Hip (Anterior or Posterior)      #[facility]  CODE STATUS: FULL CODE , DNR, DNI    Outpatient Follow-up (Specialty/Name of physician):        MEDICAL PROGNOSIS: GOOD            REHAB POTENTIAL: GOOD             ESTIMATED DISPOSITION: HOME WITH HOME CARE            ELOS: 10-14 Days   EXPECTED THERAPY:     P.T. 1hr/day       O.T. 1hr/day      S.L.P. 1hr/day     P&O Unnecessary     EXP FREQUENCY: 5 days per 7 day period     PRESCREEN COMPARISON:   I have reviewed the prescreen information and I have found no relevant changes between the preadmission screening and my post admission evaluation     RATIONALE FOR INPATIENT ADMISSION - Patient demonstrates the following: (check all that apply)  [X] Medically appropriate for rehabilitation admission  [X] Has attainable rehab goals with an appropriate initial discharge plan  [X] Has rehabilitation potential (expected to make a significant improvement within a reasonable period of time)   [X] Requires close medical management by a rehab physician, rehab nursing care, Hospitalist and comprehensive interdisciplinary team (including PT, OT, & or SLP, Prosthetics and Orthotics)   ASSESSMENT/PLAN  This is a 73 YO F with PMH of HTN, anxiety, and EtOH dependence who presents to [facility] with recurrent fall found to have have Wernicke-Korsakoff, hyponatremia, UTI,  and impaired short term memory. Patient now with gait Instability, ADL impairments and Functional impairments.    #Debility  - 2/2 alcohol use disorder  - Start Comprehensive Rehab Program: PT/OT/ST, 3hours daily and 5 days weekly  - PT: Focused on improving strength, endurance, coordination, balance, functional mobility, and transfers  - OT: Focused on improving strength, fine motor skills, coordination, posture and ADLs.    - ST: to diagnose and treat deficits in swallowing, cognition and communication.     #Alcohol Use Disorder  - Folic acid  - Thiamine   - nutritionist consult    #HTN  - Losartan 50mg daily    #Transaminitis  - RUQ sono with hepatic steatosis    #DVT ppx  - Lovenox, SCD, TEDs    #Bowel Regimen  - on hold, concern for diarrhea  - imidium 2mg Q 6 hrs PRN    #Bladder management  - BS on admission, and q 8 hours (SC if > 400)  - Monitor UO    #FEN   - Diet: DASH    #Skin:  - Skin on admission: ***  - Pressure injury/Skin: Turn Q2hrs while in bed, OOB to Chair, PT/OT     #Anxiety    #Dysphagia    - SLP: evaluation and treatment    #Sleep:   - Maintain quiet hours and low stim environment.  - Melatonin PRN to maximize participation in therapy during the day.     #Precaution  - Fall, Aspiration, cardiac, Seizure    #GOC  CODE STATUS: FULL CODE    Outpatient Follow-up (Specialty/Name of physician):    Ariana Beard  [facility]  [address] [place] [address]  Phone: [phone]  Fax: [phone]  Established Patient  Follow Up Time: 1 week  Follow-up Clinics	    [facility]  Psychiatry  [address] [place] [address]  Phone: [phone]  Follow Up Time: 2 weeks      MEDICAL PROGNOSIS: GOOD            REHAB POTENTIAL: GOOD             ESTIMATED DISPOSITION: HOME WITH HOME CARE            ELOS: 10-14 Days   EXPECTED THERAPY:     P.T. 1hr/day       O.T. 1hr/day      S.L.P. 1hr/day     P&O Unnecessary     EXP FREQUENCY: 5 days per 7 day period     PRESCREEN COMPARISON:   I have reviewed the prescreen information and I have found no relevant changes between the preadmission screening and my post admission evaluation     RATIONALE FOR INPATIENT ADMISSION - Patient demonstrates the following: (check all that apply)  [X] Medically appropriate for rehabilitation admission  [X] Has attainable rehab goals with an appropriate initial discharge plan  [X] Has rehabilitation potential (expected to make a significant improvement within a reasonable period of time)   [X] Requires close medical management by a rehab physician, rehab nursing care, Hospitalist and comprehensive interdisciplinary team (including PT, OT, & or SLP, Prosthetics and Orthotics)   73 YO F with PMH of HTN, anxiety, and EtOH dependence who presents to [facility] with recurrent fall found to have have Wernicke-Korsakoff, hyponatremia, UTI,  and impaired short term memory. Patient now with gait Instability, ADL impairments and Functional impairments.    #Debility 2/2 alcohol use disorder  - Start Comprehensive Rehab Program: PT/OT/ST, 3hours daily and 5 days weekly  - PT: Focused on improving strength, endurance, coordination, balance, functional mobility, and transfers  - OT: Focused on improving strength, fine motor skills, coordination, posture and ADLs.    - ST: Diagnose and treat deficits in swallowing, cognition and communication.     #Alcohol Use Disorder  #Wernicke-Korsakoff   #Anxiety   - Folic acid 1 mg PO daily   - Thiamine   - Nutritionist consult    #HTN  - Losartan 50mg daily    #Transaminitis  - RUQ sono with hepatic steatosis    #DVT ppx  - Lovenox, SCD, TEDs    #Bowel Regimen  - On hold w/ regular BMs  - imidium 2mg Q 6 hrs PRN    #Bladder management  - Voiding independently   - BS on admission, and q8h PRN  - Monitor UO    #FEN   - Diet: DASH    #Precaution  - Fall, Aspiration, Fracture     #GOC  CODE STATUS: FULL CODE    Outpatient Follow-up (Specialty/Name of physician):    Ariana Beard  Family Medicine  [address] [place] [id number]  Phone: [phone]  Fax: [phone]  Established Patient  Follow Up Time: 1 week  Follow-up Clinics	    [facility]  Psychiatry  [address] [facility] [place] [address]  Phone: [phone]  Follow Up Time: 2 weeks      MEDICAL PROGNOSIS: GOOD            REHAB POTENTIAL: GOOD             ESTIMATED DISPOSITION: HOME WITH HOME CARE            ELOS: 10-14 Days   EXPECTED THERAPY:     P.T. 1hr/day       O.T. 1hr/day      S.L.P. 1hr/day     P&O Unnecessary     EXP FREQUENCY: 5 days per 7 day period     PRESCREEN COMPARISON:   I have reviewed the prescreen information and I have found no relevant changes between the preadmission screening and my post admission evaluation     RATIONALE FOR INPATIENT ADMISSION - Patient demonstrates the following: (check all that apply)  [X] Medically appropriate for rehabilitation admission  [X] Has attainable rehab goals with an appropriate initial discharge plan  [X] Has rehabilitation potential (expected to make a significant improvement within a reasonable period of time)   [X] Requires close medical management by a rehab physician, rehab nursing care, Hospitalist and comprehensive interdisciplinary team (including PT, OT, & or SLP, Prosthetics and Orthotics)

## 2023-06-01 NOTE — PROGRESS NOTE ADULT - PROBLEM SELECTOR PLAN 4
Patient without history of alcohol withdrawal. Daily drinker 3+ drinks for several decades.   -S/p IV ativan 2mg and IV librium 50mg in ED   -Avoid librium iso transaminitis   -Sx triggered CIWA  - ETOH blood level <10 on 5/29  -thiamine, folate, nutrition c/s

## 2023-06-01 NOTE — H&P ADULT - ATTENDING COMMENTS
Rehab Attending- Patient seen and examined by me on 6/2- Case discussed, above note reviewed by me with modifications made    73 YO F with PMH of HTN, anxiety, and EtOH dependence who presents to [facility] with recurrent fall found to have have Wernicke-Korsakoff, hyponatremia, UTI,  and impaired short term memory. Patient now with gait Instability, ADL impairments and Functional impairments.    patient seen and evaluated bedside  reports balance issues related to anxiety, abnormality in electrolytes on admission  no current dizziness, headaches, nausea, vomiting, SOB, chest pain  moved bowels today, voiding well    MEDICATIONS  (STANDING):  enoxaparin Injectable 40 milliGRAM(s) SubCutaneous every 24 hours  folic acid 1 milliGRAM(s) Oral daily  losartan 50 milliGRAM(s) Oral daily  thiamine 100 milliGRAM(s) Oral daily    MEDICATIONS  (PRN):  loperamide 2 milliGRAM(s) Oral every 6 hours PRN Diarrhea                     10.2   4.74  )-----------( 161      ( 02 Jun 2023 06:04 )             31.1     06-02    133<L>  |  97  |  8   ----------------------------<  95  4.8   |  29  |  0.66    Ca    9.4      02 Jun 2023 06:04  Phos  4.1     06-01  Mg     1.80     06-01    TPro  6.8  /  Alb  3.4  /  TBili  0.9  /  DBili  x   /  AST  107<H>  /  ALT  148<H>  /  AlkPhos  144<H>  06-02        CAPILLARY BLOOD GLUCOSE          Vital Signs Last 24 Hrs  T(C): 36.6 (02 Jun 2023 08:27), Max: 36.8 (01 Jun 2023 20:37)  T(F): 97.9 (02 Jun 2023 08:27), Max: 98.3 (01 Jun 2023 20:37)  HR: 70 (02 Jun 2023 08:27) (70 - 79)  BP: 154/80 (02 Jun 2023 08:27) (146/80 - 154/80)  BP(mean): --  RR: 14 (02 Jun 2023 08:27) (14 - 15)  SpO2: 97% (02 Jun 2023 08:27) (96% - 97%)    Parameters below as of 02 Jun 2023 08:27  Patient On (Oxygen Delivery Method): room air    on exam  A&O x3 sl dysarthria memory good  no nystagmus  FROM with MS 5/5 all ext-   sens intact all ext   reflexes 2+ throughout    transfers CG, CG with RW- patient brought walker from home    IMP rehab gait ataxia secondary to ETOH- good candidate for three hours rehab daily  soc service eval- ETOH counseling- patient claims she will stop ETOH consumption cold turkey  continue folic acid, thiamine  HTN- continue Losartan- follow BP

## 2023-06-01 NOTE — H&P ADULT - NSHPSOCIALHISTORY_GEN_ALL_CORE
Smoking -  EtOH -   Drugs -     Marital status:     Patient lives with her , uses 2 walking sticks to ambulate, multiple falls, few steps to negotiate, completes ADLs without assistance.    CURRENT FUNCTIONAL STATUS  Date: 6/1  Transfers: CG, 1 person, RW  Gait: SBA, 1 person, 50 ft x 4, RW Smoking - daily heavy drinker  EtOH - daily heavy drinker  Drugs -     Marital status:     Patient lives with her , uses 2 walking sticks to ambulate, multiple falls, few steps to negotiate, completes ADLs without assistance.    CURRENT FUNCTIONAL STATUS  Date: 6/1  Transfers: CG, 1 person, RW  Gait: SBA, 1 person, 50 ft x 4, RW EtOH - States for many years she would have a few drinks every day after work. However after assisted - she was a  - "you start a little bit earlier"   Drugs - Denies     Marital status:     Patient lives with her , uses 2 walking sticks to ambulate, multiple falls, few steps to negotiate, completes ADLs without assistance.    CURRENT FUNCTIONAL STATUS  Date: 6/1  Transfers: CG, 1 person, RW  Gait: SBA, 1 person, 50 ft x 4, RW

## 2023-06-01 NOTE — DISCHARGE NOTE NURSING/CASE MANAGEMENT/SOCIAL WORK - PATIENT PORTAL LINK FT
You can access the FollowMyHealth Patient Portal offered by [facility] by registering at the following website: [email or web address]. By joining [facility]’s FollowMyHealth portal, you will also be able to view your health information using other applications (apps) compatible with our system.

## 2023-06-01 NOTE — PROGRESS NOTE ADULT - PROVIDER SPECIALTY LIST ADULT
Rehab Medicine
Internal Medicine

## 2023-06-01 NOTE — PROGRESS NOTE ADULT - SUBJECTIVE AND OBJECTIVE BOX
Patient is a 72y old  Female who presents with a chief complaint of Fall (01 Jun 2023 09:28)      HPI:  72F former smoker with PMH of hypertension, anxiety, and EtOH dependence, presents to ED with son complaining of frequent falls and gait instability.  Patient reports has had difficulty ambulating over the last month which has worsened in the last couple of days resulting in multiple falls.  Patient now newly ambulating with cane.  Patient states having extreme anxiety when ambulating, d/t fear of falling.  Per son patient has also had significant short-term memory loss over the last few weeks as well as tremors.  Patient also complaining of left foot paresthesias and Lt knee pain.  Patient reports has been daily drinker for years and has never stopped long enough to go through withdrawal. Denies history of seizures, intubation, hospitalization for withdrawal.  Patient's last drink was earlier this afternoon.  Additionally, patient notes 10lb weight loss over the past few weeks in setting of decreased appetite. Patient also denies fever/chills, cough, congestion, chest pain, shortness of breath, abdominal pain, nausea/vomiting/diarrhea, urinary symptoms, dizziness, weakness or any other symptoms at this time.    In the ED, patient was afebrile, HR , -156, RR 21 (satting 99% on RA). Labs notable for H/H 10.9/31.1, platelets 117, Na 122, Bicarb 21, Mag 1.2, Alk phos 167, AST//117, Tbili 2.5. XR L knee wnl. CTH with no acute changes. S/p 1L NS bolus. S/p IV ativan 2mg x 1, IV librium 50mg x 1 in ED.  (28 May 2023 05:37)    patient states she is anxious to go to rehab.    REVIEW OF SYSTEMS  denies pain    PAST MEDICAL & SURGICAL HISTORY  HTN (hypertension)    Anxiety    EtOH dependence    No significant past surgical history      CURRENT FUNCTIONAL STATUS  follow up PT pending    FAMILY HISTORY   No pertinent family history in first degree relatives        RECENT LABS/IMAGING  CBC Full  -  ( 01 Jun 2023 05:48 )  WBC Count : 4.03 K/uL  RBC Count : 2.99 M/uL  Hemoglobin : 10.3 g/dL  Hematocrit : 31.7 %  Platelet Count - Automated : 140 K/uL  Mean Cell Volume : 106.0 fL  Mean Cell Hemoglobin : 34.4 pg  Mean Cell Hemoglobin Concentration : 32.5 gm/dL  Auto Neutrophil # : x  Auto Lymphocyte # : x  Auto Monocyte # : x  Auto Eosinophil # : x  Auto Basophil # : x  Auto Neutrophil % : x  Auto Lymphocyte % : x  Auto Monocyte % : x  Auto Eosinophil % : x  Auto Basophil % : x    06-01    132<L>  |  97<L>  |  5<L>  ----------------------------<  162<H>  4.9   |  22  |  0.56    Ca    10.0      01 Jun 2023 09:28  Phos  4.1     06-01  Mg     1.80     06-01          VITALS  T(C): 36.8 (06-01-23 @ 12:32), Max: 36.9 (06-01-23 @ 06:12)  HR: 76 (06-01-23 @ 12:32) (71 - 84)  BP: 144/84 (06-01-23 @ 12:32) (134/81 - 152/89)  RR: 17 (06-01-23 @ 12:32) (17 - 18)  SpO2: 98% (06-01-23 @ 12:32) (98% - 100%)  Wt(kg): --    ALLERGIES  No Known Allergies      MEDICATIONS   enoxaparin Injectable 40 milliGRAM(s) SubCutaneous every 24 hours  loperamide 2 milliGRAM(s) Oral every 6 hours PRN  losartan 50 milliGRAM(s) Oral daily  sodium chloride 0.9%. 1000 milliLiter(s) IV Continuous <Continuous>  thiamine IVPB 500 milliGRAM(s) IV Intermittent every 8 hours      ----------------------------------------------------------------------------------------  PHYSICAL EXAM  Constitutional - NAD, Comfortable  HEENT - NCAT, EOMI  Chest - no respiratory distress  Cardiovascular - RRR, S1S2   Abdomen - Soft, NTND  Extremities - No C/C/E, No calf tenderness   Neurologic Exam -                    Cognitive - Awake, Alert, AAO to self, place, date, year, situation     Communication - Fluent, No dysarthria     Cranial Nerves - CN 2-12 intact     Motor -                      LEFT    UE - 4+/5                    RIGHT UE - 4+/5                     LEFT    LE - 4+/5                     RIGHT LE - 4+/5      Sensory - Intact to LT      Balance - WNL Static  Psychiatric - Mood stable, Affect WNL  ----------------------------------------------------------------------------------------  ASSESSMENT/PLAN  71 yo f p/w fall, found to have UTI, 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

## 2023-06-01 NOTE — H&P ADULT - NSHPLABSRESULTS_GEN_ALL_CORE
RECENT LABS/IMAGING                        10.3   4.03  )-----------( 140      ( 01 Jun 2023 05:48 )             31.7     06-01    132<L>  |  97<L>  |  5<L>  ----------------------------<  162<H>  4.9   |  22  |  0.56    Ca    10.0      01 Jun 2023 09:28  Phos  4.1     06-01  Mg     1.80     06-01    < from: US Abdomen Upper Quadrant Right (05.28.23 @ 09:57) >    IMPRESSION:  Findings suggest hepatic steatosis. No evidence for intrahepaticor   extrahepatic biliary ductal dilatation. A 4 mm echogenic nodular focus is   identified along the posterior gallbladder wall, nonmobile, likely   representative of a gallbladder polyp. No diffuse gallbladder wall   thickening. No pericholecystic fluid.    CT Head No Cont (05.28.23 @ 01:12)     IMPRESSION:  No acute intracranial hemorrhage or calvarial fracture.    X-ray Knee 4 Views, Left (05.28.23 @ 01:00)     IMPRESSION:  1.  No acute osseous abnormalities. RECENT LABS/IMAGING                        10.3   4.03  )-----------( 140      ( 01 Jun 2023 05:48 )             31.7     06-01    132<L>  |  97<L>  |  5<L>  ----------------------------<  162<H>  4.9   |  22  |  0.56    Ca    10.0      01 Jun 2023 09:28  Phos  4.1     06-01  Mg     1.80     06-01    US Abdomen Upper Quadrant Right (05.28.23 @ 09:57)     IMPRESSION:  Findings suggest hepatic steatosis. No evidence for intrahepaticor   extrahepatic biliary ductal dilatation. A 4 mm echogenic nodular focus is   identified along the posterior gallbladder wall, nonmobile, likely   representative of a gallbladder polyp. No diffuse gallbladder wall   thickening. No pericholecystic fluid.    CT Head No Cont (05.28.23 @ 01:12)     IMPRESSION:  No acute intracranial hemorrhage or calvarial fracture.    X-ray Knee 4 Views, Left (05.28.23 @ 01:00)     IMPRESSION:  1.  No acute osseous abnormalities.

## 2023-06-02 LAB
ALBUMIN SERPL ELPH-MCNC: 3.4 G/DL — SIGNIFICANT CHANGE UP (ref 3.3–5)
ALP SERPL-CCNC: 144 U/L — HIGH (ref 40–120)
ALT FLD-CCNC: 148 U/L — HIGH (ref 10–45)
ANION GAP SERPL CALC-SCNC: 7 MMOL/L — SIGNIFICANT CHANGE UP (ref 5–17)
AST SERPL-CCNC: 107 U/L — HIGH (ref 10–40)
BASOPHILS # BLD AUTO: 0.04 K/UL — SIGNIFICANT CHANGE UP (ref 0–0.2)
BASOPHILS NFR BLD AUTO: 0.8 % — SIGNIFICANT CHANGE UP (ref 0–2)
BILIRUB SERPL-MCNC: 0.9 MG/DL — SIGNIFICANT CHANGE UP (ref 0.2–1.2)
BUN SERPL-MCNC: 8 MG/DL — SIGNIFICANT CHANGE UP (ref 7–23)
CALCIUM SERPL-MCNC: 9.4 MG/DL — SIGNIFICANT CHANGE UP (ref 8.4–10.5)
CHLORIDE SERPL-SCNC: 97 MMOL/L — SIGNIFICANT CHANGE UP (ref 96–108)
CO2 SERPL-SCNC: 29 MMOL/L — SIGNIFICANT CHANGE UP (ref 22–31)
CREAT SERPL-MCNC: 0.66 MG/DL — SIGNIFICANT CHANGE UP (ref 0.5–1.3)
EGFR: 94 ML/MIN/1.73M2 — SIGNIFICANT CHANGE UP
EOSINOPHIL # BLD AUTO: 0.12 K/UL — SIGNIFICANT CHANGE UP (ref 0–0.5)
EOSINOPHIL NFR BLD AUTO: 2.5 % — SIGNIFICANT CHANGE UP (ref 0–6)
GLUCOSE SERPL-MCNC: 95 MG/DL — SIGNIFICANT CHANGE UP (ref 70–99)
HCT VFR BLD CALC: 31.1 % — LOW (ref 34.5–45)
HCV AB S/CO SERPL IA: 0.09 S/CO — SIGNIFICANT CHANGE UP (ref 0–0.99)
HCV AB SERPL-IMP: SIGNIFICANT CHANGE UP
HGB BLD-MCNC: 10.2 G/DL — LOW (ref 11.5–15.5)
IMM GRANULOCYTES NFR BLD AUTO: 0.6 % — SIGNIFICANT CHANGE UP (ref 0–0.9)
LYMPHOCYTES # BLD AUTO: 1.13 K/UL — SIGNIFICANT CHANGE UP (ref 1–3.3)
LYMPHOCYTES # BLD AUTO: 23.8 % — SIGNIFICANT CHANGE UP (ref 13–44)
MCHC RBC-ENTMCNC: 32.8 GM/DL — SIGNIFICANT CHANGE UP (ref 32–36)
MCHC RBC-ENTMCNC: 34.2 PG — HIGH (ref 27–34)
MCV RBC AUTO: 104.4 FL — HIGH (ref 80–100)
MONOCYTES # BLD AUTO: 0.94 K/UL — HIGH (ref 0–0.9)
MONOCYTES NFR BLD AUTO: 19.8 % — HIGH (ref 2–14)
NEUTROPHILS # BLD AUTO: 2.48 K/UL — SIGNIFICANT CHANGE UP (ref 1.8–7.4)
NEUTROPHILS NFR BLD AUTO: 52.5 % — SIGNIFICANT CHANGE UP (ref 43–77)
NRBC # BLD: 0 /100 WBCS — SIGNIFICANT CHANGE UP (ref 0–0)
PLATELET # BLD AUTO: 161 K/UL — SIGNIFICANT CHANGE UP (ref 150–400)
POTASSIUM SERPL-MCNC: 4.8 MMOL/L — SIGNIFICANT CHANGE UP (ref 3.5–5.3)
POTASSIUM SERPL-SCNC: 4.8 MMOL/L — SIGNIFICANT CHANGE UP (ref 3.5–5.3)
PROT SERPL-MCNC: 6.8 G/DL — SIGNIFICANT CHANGE UP (ref 6–8.3)
RBC # BLD: 2.98 M/UL — LOW (ref 3.8–5.2)
RBC # FLD: 13.6 % — SIGNIFICANT CHANGE UP (ref 10.3–14.5)
SODIUM SERPL-SCNC: 133 MMOL/L — LOW (ref 135–145)
WBC # BLD: 4.74 K/UL — SIGNIFICANT CHANGE UP (ref 3.8–10.5)
WBC # FLD AUTO: 4.74 K/UL — SIGNIFICANT CHANGE UP (ref 3.8–10.5)

## 2023-06-02 PROCEDURE — 99223 1ST HOSP IP/OBS HIGH 75: CPT

## 2023-06-02 RX ADMIN — Medication 100 MILLIGRAM(S): at 11:13

## 2023-06-02 RX ADMIN — LOSARTAN POTASSIUM 50 MILLIGRAM(S): 100 TABLET, FILM COATED ORAL at 05:03

## 2023-06-02 RX ADMIN — ENOXAPARIN SODIUM 40 MILLIGRAM(S): 100 INJECTION SUBCUTANEOUS at 05:02

## 2023-06-02 RX ADMIN — Medication 1 MILLIGRAM(S): at 11:13

## 2023-06-02 NOTE — DIETITIAN INITIAL EVALUATION ADULT - PERTINENT MEDS FT
MEDICATIONS  (STANDING):  enoxaparin Injectable 40 milliGRAM(s) SubCutaneous every 24 hours  folic acid 1 milliGRAM(s) Oral daily  losartan 50 milliGRAM(s) Oral daily  thiamine 100 milliGRAM(s) Oral daily    MEDICATIONS  (PRN):  loperamide 2 milliGRAM(s) Oral every 6 hours PRN Diarrhea

## 2023-06-02 NOTE — DIETITIAN INITIAL EVALUATION ADULT - NS FNS DIET ORDER
on DASH-TLC Diet w/ Thin Liquids (IDDSI Level 0) & Ensure Plus High Protein 8oz PO BID   Recommend Change Diet to Regular Diet   Recommend Decrease Supplement to Ensure Plus High Protein 8oz PO Daily (Per Patient Request)

## 2023-06-02 NOTE — DIETITIAN INITIAL EVALUATION ADULT - ADD RECOMMEND
1) Monitor Weights, Intake, Tolerance, Skin & Labwork  2) Recommend Change Diet to Regular Diet   3) Recommend Decrease Supplement to Ensure Plus High Protein 8oz PO Daily (Per Patient Request)   4) Education Provided on Proper Nutrition   5) Continue Nutrition Plan of Care

## 2023-06-02 NOTE — DIETITIAN INITIAL EVALUATION ADULT - PERTINENT LABORATORY DATA
06-02    133<L>  |  97  |  8   ----------------------------<  95  4.8   |  29  |  0.66    Ca    9.4      02 Jun 2023 06:04  Phos  4.1     06-01  Mg     1.80     06-01    TPro  6.8  /  Alb  3.4  /  TBili  0.9  /  DBili  x   /  AST  107<H>  /  ALT  148<H>  /  AlkPhos  144<H>  06-02

## 2023-06-02 NOTE — DIETITIAN INITIAL EVALUATION ADULT - ORAL NUTRITION SUPPLEMENTS
[facility]  [address], [place] [address]      Progress Note          Subjective   Patient ID: Pankaj is a 58 year old male.    Chief Complaint   Patient presents with   • Hospital F/U     Pt seen at [facility] on 7/5/2019 for pneumonia     HPI:     Mr. Payton is here for a post hospital follow-up, was admitted to [facility] from July 2 to July 8, 2019 for COPD with exacerbation, respiratory failure requiring intubation, and pneumonia.  He had been sent to the ER by the [facility] after being seen in the office and being found to be hypoxic with a pulse oximetry of 86 to 90%.  He was treated aggressively with IV antibiotics, IV steroids, bronchodilator therapy after being weaned off the ventilator.  They also suspect that he has sleep apnea I would like him to get an outpatient sleep study.  They also gave him an appointment with pulmonary, Dr. Murillo, for which she needs a referral.  He feels much better now, has minimal residual cough, pulse oximetry today is 97% on room air.  He has new medications in the form of Incruse Ellipta and Pro-air as well as Atrovent nebulizer treatments and a prednisone taper.  He is trying to quit smoking again, but is finding it hard.  He has nicotine lozenges, cannot afford the nicotine patch but [facility]  His blood pressure is borderline high today, no headaches or dizziness.  He is still feeling depressed, [facility].  He is currently taking mirtazapine 30 mg nightly.    Patient's medications, allergies, past medical, surgical, social and family histories were reviewed and updated as appropriate.    Problem List  Patient Active Problem List   Diagnosis   • Seasonal allergic rhinitis   • Chronic obstructive pulmonary disease (CMS/HCC)   • Essential hypertension   • Obesity (BMI 30-39.9)   • Chronic major depressive disorder, recurrent episode (CMS/HCC)   • Ventral hernia without obstruction or  gangrene   • Nicotine dependence   • Positive skin test for tuberculosis   • Hypogonadotropic hypogonadism (CMS/HCC)   • Hyperlipidemia   • Fasting hyperglycemia   • Daytime somnolence   • Swelling of calf   • Shortness of breath       Past Medical History  Past Medical History:   Diagnosis Date   • Allergy    • Essential (primary) hypertension        Past Surgical History  Past Surgical History:   Procedure Laterality Date   • Hernia repair         Current Medications  Current Outpatient Medications   Medication Sig Dispense Refill   • ipratropium (ATROVENT) 0.02 % nebulizer solution Take 0.5 mg by nebulization 4 times daily.     • mirtazapine (REMERON) 30 MG tablet Take 30 mg by mouth nightly.     • nicotine (NICODERM) 14 MG/24HR patch Place 1 patch onto the skin every 24 hours.     • predniSONE (DELTASONE) 10 MG tablet Take 10 mg by mouth daily.     • predniSONE (DELTASONE) 20 MG tablet Take 20 mg by mouth daily.     • fluticasone-umeclidinium-vilanterol (TRELEGY ELLIPTA) 100-62.5-25 MCG/INH inhaler      • clonazePAM (KLONOPIN) 1 MG tablet   2   • benazepril (LOTENSIN) 40 MG tablet Take by mouth daily.     • hydrochlorothiazide (HYDRODIURIL) 25 MG tablet Take by mouth daily.     • QUEtiapine (SEROQUEL XR) 150 MG 24 hr tablet Take 600 mg by mouth.      • simvastatin (ZOCOR) 20 MG tablet        No current facility-administered medications for this visit.        Allergies  ALLERGIES:   Allergen Reactions   • No Known Allergies Other (See Comments)       Family History  Family History   Problem Relation Age of Onset   • Cancer, Lung Mother    • Stroke Mother    • Asthma Sister         Social History  Social History     Tobacco Use   • Smoking status: Current Every Day Smoker   • Smokeless tobacco: Never Used   Substance Use Topics   • Alcohol use: Yes   • Drug use: No           Review of Systems   Constitutional: Negative.  Negative for chills and fever.   HENT: Negative.    Respiratory: Positive for cough. Negative  for shortness of breath and wheezing.    Cardiovascular: Negative for chest pain and palpitations.   Gastrointestinal: Negative.    Endocrine: Negative.    Genitourinary: Negative.    Musculoskeletal: Negative.    Neurological: Negative for dizziness and headaches.   Psychiatric/Behavioral: Positive for dysphoric mood.       Visit Vitals  /85   Pulse 102   Temp 98.8 °F (37.1 °C)   Resp 18   Ht 6' 1\" (1.854 m)   Wt 108.9 kg (240 lb)   SpO2 97%   BMI 31.66 kg/m²       Objective   Physical Exam   Constitutional: He is oriented to person, place, and time. He appears well-developed. No distress.   obese   HENT:   Head: Normocephalic and atraumatic.   Eyes: Pupils are equal, round, and reactive to light. Conjunctivae are normal.   Neck: No JVD present. No thyromegaly present.   Cardiovascular: Normal rate, regular rhythm and normal heart sounds.   No murmur heard.  Pulmonary/Chest: Breath sounds normal. No respiratory distress. He has no wheezes. He has no rales.   Musculoskeletal: Normal range of motion. He exhibits no edema.   Neurological: He is alert and oriented to person, place, and time.   Psychiatric: He has a normal mood and affect.   Nursing note and vitals reviewed.      No visits with results within 2 Week(s) from this visit.   Latest known visit with results is:   Lab Services on 06/27/2019   Component Date Value Ref Range Status   • Testosterone Free Males ED 06/27/2019 36.0*  Final     Problem List Items Addressed This Visit        Behavioral    Chronic major depressive disorder, recurrent episode (CMS/HCC)     Psychological condition is still active.  Continue with mirtazapine.  [facility]            Nervous    Nicotine dependence     Actively trying to quit smoking.  Using nicotine lozenges now, asking about e-cigarettes and/or nicotine patches.  Told him either of those would be worth a try.            Respiratory    Chronic obstructive pulmonary disease (CMS/HCC) - Primary      COPD is improving now, status post exacerbation requiring intubation.  Continue current regimen.  Referral given to follow-up with pulmonary specialist, Dr. Murillo.            Circulatory    Essential hypertension     Hypertension is borderline controlled today.  Continue benazepril for now as well as hydrochlorothiazide.            Other    Sleep disturbance     Referral for overnight split-night sleep study given to patient.         Relevant Orders    SERVICE TO SLEEP MEDICINE    SERVICE TO PULMONARY MEDICINE      Return visit in 2 to 3 months.   on Ensure Plus High Protein 8oz PO BID (Provides 700kcal & 40grams of Protein)  States Only Takes 1/2 of Each  Recommend Change Supplement to Ensure Plus High Protein 8oz PO Daily (Provides 350kcal & 20grams of Protein)   (Per Patient Request)

## 2023-06-02 NOTE — CONSULT NOTE ADULT - ASSESSMENT
71 YO F with PMH of HTN, anxiety, and EtOH dependence who presents to [facility] with recurrent fall found to have have Wernicke-Korsakoff, hyponatremia, UTI,  and impaired short term memory. Patient now with gait Instability, ADL impairments and Functional impairments.    #Debility 2/2 alcohol use disorder  #Alcohol Use Disorder  #Wernicke-Korsakoff   #Anxiety   - Folic acid 1 mg PO daily   - Thiamine   - Nutritionist consult    #Macrocytic anemia  - Will monitor Hg.Hct, transfuse if Hg <7  - Noted iron panel, B12 and folate WNL    #Hyponatremia  - Noted Na 132, will monitor for now    #HTN  - Losartan 50mg daily    #Transaminitis  - RUQ sono with hepatic steatosis    #DVT ppx:  Lovenox 71 YO F with PMH of HTN, anxiety, and EtOH dependence who presents to [facility] with recurrent fall found to have have Wernicke-Korsakoff, hyponatremia, UTI,  and impaired short term memory. Patient now with gait Instability, ADL impairments and Functional impairments.    #Debility 2/2 alcohol use disorder  #Alcohol Use Disorder  #Wernicke-Korsakoff   #Anxiety   - Folic acid 1 mg PO daily   - Thiamine   #Macrocytic anemia  - Will monitor Hg.Hct, transfuse if Hg <7  - Noted iron panel, B12 and folate WNL    #Hyponatremia  - Noted Na 132, will monitor for now    #HTN  - Losartan 50mg daily    #Transaminitis  - RUQ sono with hepatic steatosis    #DVT ppx:  Lovenox

## 2023-06-02 NOTE — DIETITIAN INITIAL EVALUATION ADULT - ORAL INTAKE PTA/DIET HISTORY
Patient Does Follow Low Sodium Diet @Home  Consumes 3 Meals a Day    Family Usually Cooks For Patient   Does Take Vitamin/Supplements @Home (Tumeric & Probiotic)

## 2023-06-02 NOTE — CONSULT NOTE ADULT - SUBJECTIVE AND OBJECTIVE BOX
73 YO F  with PMH of HTN, anxiety, and EtOH dependence (drinks 8-10 oz vodka daily for 40+ years), admitted for gait instability and recurrent falls for past 6 months. CTH wnl, BMP notable for hyponatremia to 122 likely 2/2 "beer potomania."  Patient was placed on CIWA and IVF with continuous NS. Patient seen by PT, who recommended rolling walker. Patient not interested in stopping drinking because she likes it.  Course also c/b UTI with Kleb pneumo treated with ceftriaxone x3d.      On admission, patient noted to have symptoms c/f Wernicke-Korsakoff: lateral nystagmus (with rightward gaze), disorientation (was not aware that she had been staying in same room the entire admission), and wide-based ataxia.  Also noted to have confabulations (would describe coordinating events with nighttime nursing staff) and impaired short term memory (forgot conversations, people, etc from day prior).  Patient was given IV thiamine, B9, B12, multivitamins, and electrolytes repleted. CTH negative.          PAST MEDICAL & SURGICAL HISTORY:  HTN (hypertension)  Anxiety  EtOH dependence  No significant past surgical history    FAMILY HISTORY  Father: - at age - with history of   Mother: - at age - with history of     SOCIAL HISTORY  Substance Use (street drugs): (  ) never used  (  ) other:  Tobacco Usage:  (   ) never smoked   (   ) former smoker   (   ) current smoker  (     ) pack year  Alcohol Usage:  Sexual History:   Recent Travel:      Allergies  No Known Allergies  Intolerances    folic acid 1 milliGRAM(s) Oral daily  thiamine 100 milliGRAM(s) Oral daily    REVIEW OF SYSTEMS:  CONSTITUTIONAL: No fever, weight loss, or fatigue  EYES: No eye pain, visual disturbances, or discharge  ENMT:  No difficulty hearing, tinnitus, vertigo; No sinus or throat pain  NECK: No pain or stiffness  BREASTS: No pain, masses, or nipple discharge  RESPIRATORY: No cough, wheezing, chills or hemoptysis; No shortness of breath  CARDIOVASCULAR: No chest pain, palpitations, dizziness, or leg swelling  GASTROINTESTINAL: No abdominal or epigastric pain. No nausea, vomiting, or hematemesis; No diarrhea or constipation. No melena or hematochezia.  GENITOURINARY: No dysuria, frequency, hematuria, or incontinence  NEUROLOGICAL: No headaches, memory loss, loss of strength, numbness, or tremors  SKIN: No itching, burning, rashes, or lesions   LYMPH NODES: No enlarged glands  ENDOCRINE: No heat or cold intolerance; No hair loss  MUSCULOSKELETAL: No joint pain or swelling; No muscle, back, or extremity pain  PSYCHIATRIC: No depression, anxiety, mood swings, or difficulty sleeping  HEME/LYMPH: No easy bruising, or bleeding gums  ALLERY AND IMMUNOLOGIC: No hives or eczema    ALL ROS REVIEWED AND NORMAL EXCEPT AS STATED ABOVE    T(C): 36.8 (06-01-23 @ 20:37), Max: 36.8 (06-01-23 @ 12:32)  HR: 79 (06-02-23 @ 05:06) (76 - 79)  BP: 152/77 (06-02-23 @ 05:06) (144/84 - 152/77)  RR: 15 (06-01-23 @ 20:37) (15 - 17)  SpO2: 96% (06-01-23 @ 20:37) (96% - 98%)  Wt(kg): --Vital Signs Last 24 Hrs  T(C): 36.8 (01 Jun 2023 20:37), Max: 36.8 (01 Jun 2023 12:32)  T(F): 98.3 (01 Jun 2023 20:37), Max: 98.3 (01 Jun 2023 20:37)  HR: 79 (02 Jun 2023 05:06) (76 - 79)  BP: 152/77 (02 Jun 2023 05:06) (144/84 - 152/77)  BP(mean): --  RR: 15 (01 Jun 2023 20:37) (15 - 17)  SpO2: 96% (01 Jun 2023 20:37) (96% - 98%)    Parameters below as of 01 Jun 2023 20:37  Patient On (Oxygen Delivery Method): room air    PHYSICAL EXAM:  GENERAL: NAD, well-groomed, well-developed  HEAD:  Atraumatic, Normocephalic  EYES: EOMI, PERRLA, conjunctiva and sclera clear  ENMT: No tonsillar erythema, exudates, or enlargement; Moist mucous membranes, Good dentition, No lesions  NECK: Supple, No JVD, Normal thyroid  NERVOUS SYSTEM:  Alert & Oriented X3, Good concentration; Motor Strength 5/5 B/L upper and lower extremities; DTRs 2+ intact and symmetric  CHEST/LUNG: Clear to percussion bilaterally; No rales, rhonchi, wheezing, or rubs  HEART: Regular rate and rhythm; No murmurs, rubs, or gallops  ABDOMEN: Soft, Nontender, Nondistended; Bowel sounds present  EXTREMITIES:  2+ Peripheral Pulses, No clubbing, cyanosis, or edema  LYMPH: No lymphadenopathy noted  SKIN: No rashes or lesions    LABS:                        10.2   4.74  )-----------( 161      ( 02 Jun 2023 06:04 )             31.1     06-01    132<L>  |  97<L>  |  5<L>  ----------------------------<  162<H>  4.9   |  22  |  0.56    Ca    10.0      01 Jun 2023 09:28  Phos  4.1     06-01  Mg     1.80     06-01    CAPILLARY BLOOD GLUCOSE    RADIOLOGY & ADDITIONAL TESTS:  CT Head No Cont (05.28.23 @ 01:12) >  IMPRESSION:  No acute intracranial hemorrhage or calvarial fracture.    US Abdomen Upper Quadrant Right (05.28.23 @ 09:57) >  IMPRESSION:  Findings suggest hepatic steatosis. No evidence for intrahepaticor   extrahepatic biliary ductal dilatation. A 4 mm echogenic nodular focus is   identified along the posterior gallbladder wall, nonmobile, likely   representative of a gallbladder polyp. No diffuse gallbladder wall   thickening. No pericholecystic fluid.    Consultant(s) Notes Reviewed:  [x ] YES  [ ] NO  Care Discussed with Consultants/Other Providers [ x] YES  [ ] NO  Imaging Personally Reviewed:  [ ] YES  [x ] NO 73 YO F  with PMH of HTN, anxiety, and EtOH dependence (drinks 8-10 oz vodka daily for 40+ years), admitted for gait instability and recurrent falls for past 6 months. CTH wnl, BMP notable for hyponatremia to 122 likely 2/2 "beer potomania."  Patient was placed on CIWA and IVF with continuous NS. Patient seen by PT, who recommended rolling walker. Patient not interested in stopping drinking because she likes it.  Course also c/b UTI with Kleb pneumo treated with ceftriaxone x3d.      On admission, patient noted to have symptoms c/f Wernicke-Korsakoff: lateral nystagmus (with rightward gaze), disorientation (was not aware that she had been staying in same room the entire admission), and wide-based ataxia.  Also noted to have confabulations (would describe coordinating events with nighttime nursing staff) and impaired short term memory (forgot conversations, people, etc from day prior).  Patient was given IV thiamine, B9, B12, multivitamins, and electrolytes repleted. CTH negative.    Patient denies chest pain, SOB, tolerating diet  Had a BM this morning, urinating well    PAST MEDICAL & SURGICAL HISTORY:  HTN (hypertension)  Anxiety  EtOH dependence  No significant past surgical history    FAMILY HISTORY  Mother passed away at age 88  Father had glaucoma and asthma    SOCIAL HISTORY  Substance Use (street drugs): ( x ) never used  (  ) other:  Tobacco Usage:  ( x  ) never smoked   (   ) former smoker   (   ) current smoker  (     ) pack year  Alcohol Usage:Reports drinking vodka with seltzer every night      Allergies  No Known Allergies  Intolerances    folic acid 1 milliGRAM(s) Oral daily  thiamine 100 milliGRAM(s) Oral daily    REVIEW OF SYSTEMS:  CONSTITUTIONAL: No fever, weight loss, or fatigue  EYES: No eye pain, visual disturbances, or discharge  ENMT:  No difficulty hearing, tinnitus, vertigo; No sinus or throat pain  NECK: No pain or stiffness  BREASTS: No pain, masses, or nipple discharge  RESPIRATORY: No cough, wheezing, chills or hemoptysis; No shortness of breath  CARDIOVASCULAR: No chest pain, palpitations, dizziness, or leg swelling  GASTROINTESTINAL: No abdominal or epigastric pain. No nausea, vomiting, or hematemesis; No diarrhea or constipation. No melena or hematochezia.  GENITOURINARY: No dysuria, frequency, hematuria, or incontinence  NEUROLOGICAL: No headaches, memory loss, loss of strength, numbness, or tremors  SKIN: No itching, burning, rashes, or lesions   LYMPH NODES: No enlarged glands  ENDOCRINE: No heat or cold intolerance; No hair loss  MUSCULOSKELETAL: No joint pain or swelling; No muscle, back, or extremity pain  PSYCHIATRIC: No depression, anxiety, mood swings, or difficulty sleeping  HEME/LYMPH: No easy bruising, or bleeding gums  ALLERY AND IMMUNOLOGIC: No hives or eczema    ALL ROS REVIEWED AND NORMAL EXCEPT AS STATED ABOVE    T(C): 36.8 (06-01-23 @ 20:37), Max: 36.8 (06-01-23 @ 12:32)  HR: 79 (06-02-23 @ 05:06) (76 - 79)  BP: 152/77 (06-02-23 @ 05:06) (144/84 - 152/77)  RR: 15 (06-01-23 @ 20:37) (15 - 17)  SpO2: 96% (06-01-23 @ 20:37) (96% - 98%)  Wt(kg): --Vital Signs Last 24 Hrs  T(C): 36.8 (01 Jun 2023 20:37), Max: 36.8 (01 Jun 2023 12:32)  T(F): 98.3 (01 Jun 2023 20:37), Max: 98.3 (01 Jun 2023 20:37)  HR: 79 (02 Jun 2023 05:06) (76 - 79)  BP: 152/77 (02 Jun 2023 05:06) (144/84 - 152/77)  BP(mean): --  RR: 15 (01 Jun 2023 20:37) (15 - 17)  SpO2: 96% (01 Jun 2023 20:37) (96% - 98%)    Parameters below as of 01 Jun 2023 20:37  Patient On (Oxygen Delivery Method): room air    PHYSICAL EXAM:  GENERAL: NAD, well-groomed, well-developed  HEAD:  Atraumatic, Normocephalic  EYES: EOMI, PERRLA, conjunctiva and sclera clear  ENMT: No tonsillar erythema, exudates, or enlargement; Moist mucous membranes, Good dentition, No lesions  NECK: Supple, No JVD, Normal thyroid  NERVOUS SYSTEM:  Alert & Oriented X3, Good concentration; Motor Strength 5/5 B/L upper and lower extremities; DTRs 2+ intact and symmetric  CHEST/LUNG: Clear to percussion bilaterally; No rales, rhonchi, wheezing, or rubs  HEART: Regular rate and rhythm; No murmurs, rubs, or gallops  ABDOMEN: Soft, Nontender, Nondistended; Bowel sounds present  EXTREMITIES:  2+ Peripheral Pulses, No clubbing, cyanosis, or edema  LYMPH: No lymphadenopathy noted  SKIN: No rashes or lesions    LABS:                        10.2   4.74  )-----------( 161      ( 02 Jun 2023 06:04 )             31.1     06-01    132<L>  |  97<L>  |  5<L>  ----------------------------<  162<H>  4.9   |  22  |  0.56    Ca    10.0      01 Jun 2023 09:28  Phos  4.1     06-01  Mg     1.80     06-01    CAPILLARY BLOOD GLUCOSE    RADIOLOGY & ADDITIONAL TESTS:  CT Head No Cont (05.28.23 @ 01:12) >  IMPRESSION:  No acute intracranial hemorrhage or calvarial fracture.    US Abdomen Upper Quadrant Right (05.28.23 @ 09:57) >  IMPRESSION:  Findings suggest hepatic steatosis. No evidence for intrahepaticor   extrahepatic biliary ductal dilatation. A 4 mm echogenic nodular focus is   identified along the posterior gallbladder wall, nonmobile, likely   representative of a gallbladder polyp. No diffuse gallbladder wall   thickening. No pericholecystic fluid.    Consultant(s) Notes Reviewed:  [x ] YES  [ ] NO  Care Discussed with Consultants/Other Providers [ x] YES  [ ] NO  Imaging Personally Reviewed:  [ ] YES  [x ] NO

## 2023-06-02 NOTE — DIETITIAN INITIAL EVALUATION ADULT - OTHER INFO
Initial Nutrition Assessment   72yr Old Female   Denies Food Allergy/Intolerance  Tolerates Diet Well - No Chewing/Swallowing Complications (Per Patient)  No Pressure Ulcers (as Per Nursing Flow Sheets)  No Edema Noted (as Per Nursing Flow Sheets)  No Recent Nausea/Vomiting/Constipation & Some Recent Diarrhea (as Per Patient)

## 2023-06-03 LAB
APPEARANCE UR: CLEAR — SIGNIFICANT CHANGE UP
BILIRUB UR-MCNC: NEGATIVE — SIGNIFICANT CHANGE UP
COLOR SPEC: YELLOW — SIGNIFICANT CHANGE UP
DIFF PNL FLD: NEGATIVE — SIGNIFICANT CHANGE UP
GLUCOSE UR QL: NEGATIVE MG/DL — SIGNIFICANT CHANGE UP
KETONES UR-MCNC: NEGATIVE MG/DL — SIGNIFICANT CHANGE UP
LEUKOCYTE ESTERASE UR-ACNC: NEGATIVE — SIGNIFICANT CHANGE UP
NITRITE UR-MCNC: NEGATIVE — SIGNIFICANT CHANGE UP
PH UR: 5 — SIGNIFICANT CHANGE UP (ref 5–8)
PROT UR-MCNC: NEGATIVE MG/DL — SIGNIFICANT CHANGE UP
SP GR SPEC: 1.01 — SIGNIFICANT CHANGE UP (ref 1–1.03)
UROBILINOGEN FLD QL: 0.2 MG/DL — SIGNIFICANT CHANGE UP (ref 0.2–1)

## 2023-06-03 PROCEDURE — 99232 SBSQ HOSP IP/OBS MODERATE 35: CPT

## 2023-06-03 RX ORDER — LANOLIN ALCOHOL/MO/W.PET/CERES
6 CREAM (GRAM) TOPICAL AT BEDTIME
Refills: 0 | Status: DISCONTINUED | OUTPATIENT
Start: 2023-06-03 | End: 2023-06-09

## 2023-06-03 RX ORDER — LIDOCAINE 4 G/100G
1 CREAM TOPICAL DAILY
Refills: 0 | Status: DISCONTINUED | OUTPATIENT
Start: 2023-06-03 | End: 2023-06-09

## 2023-06-03 RX ORDER — ACETAMINOPHEN 500 MG
650 TABLET ORAL ONCE
Refills: 0 | Status: COMPLETED | OUTPATIENT
Start: 2023-06-03 | End: 2023-06-03

## 2023-06-03 RX ADMIN — Medication 100 MILLIGRAM(S): at 12:21

## 2023-06-03 RX ADMIN — ENOXAPARIN SODIUM 40 MILLIGRAM(S): 100 INJECTION SUBCUTANEOUS at 05:34

## 2023-06-03 RX ADMIN — LIDOCAINE 1 PATCH: 4 CREAM TOPICAL at 12:01

## 2023-06-03 RX ADMIN — Medication 6 MILLIGRAM(S): at 22:10

## 2023-06-03 RX ADMIN — LOSARTAN POTASSIUM 50 MILLIGRAM(S): 100 TABLET, FILM COATED ORAL at 05:34

## 2023-06-03 RX ADMIN — Medication 650 MILLIGRAM(S): at 22:10

## 2023-06-03 RX ADMIN — Medication 1 MILLIGRAM(S): at 12:21

## 2023-06-03 NOTE — PROGRESS NOTE ADULT - ASSESSMENT
73 YO F with PMH of HTN, anxiety, and EtOH dependence who presents to [facility] with recurrent fall found to have have Wernicke-Korsakoff, hyponatremia, UTI,  and impaired short term memory. Patient now with gait Instability, ADL impairments and Functional impairments.    #Debility 2/2 alcohol use disorder  #Alcohol Use Disorder  #Wernicke-Korsakoff   #Anxiety   - Folic acid 1 mg PO daily   - Thiamine   #Macrocytic anemia  - Will monitor Hg.Hct, transfuse if Hg <7  - Noted iron panel, B12 and folate WNL    #Hyponatremia  - Noted Na 133, will monitor for now    #HTN  - Losartan 50mg daily    #Transaminitis  - RUQ sono with hepatic steatosis    #DVT ppx:  Lovenox

## 2023-06-03 NOTE — PROGRESS NOTE ADULT - SUBJECTIVE AND OBJECTIVE BOX
Reports did not sleep  well.  Pain is controlled.   Shaving legs and nicking her skin - advised to stop.   No other new ROS.  Has been tolerating rehabilitation program.    VITALS  T(C): 36.7 (06-02-23 @ 20:29), Max: 36.7 (06-02-23 @ 20:29)  HR: 86 (06-03-23 @ 05:32) (70 - 86)  BP: 147/82 (06-03-23 @ 05:32) (135/77 - 154/80)  RR: 16 (06-03-23 @ 05:32) (14 - 16)  SpO2: 96% (06-03-23 @ 05:32) (95% - 97%)  Wt(kg): --     MEDICATIONS   enoxaparin Injectable 40 milliGRAM(s) every 24 hours  folic acid 1 milliGRAM(s) daily  loperamide 2 milliGRAM(s) every 6 hours PRN  losartan 50 milliGRAM(s) daily  thiamine 100 milliGRAM(s) daily      RECENT LABS/IMAGING                        10.2   4.74  )-----------( 161      ( 02 Jun 2023 06:04 )             31.1     06-02    133<L>  |  97  |  8   ----------------------------<  95  4.8   |  29  |  0.66    Ca    9.4      02 Jun 2023 06:04  Phos  4.1     06-01  Mg     1.80     06-01    TPro  6.8  /  Alb  3.4  /  TBili  0.9  /  DBili  x   /  AST  107<H>  /  ALT  148<H>  /  AlkPhos  144<H>  06-02               ------------------------------------------  PHYSICAL EXAM  Constitutional - NAD, Comfortable  Pulm - Breathing comfortably, No wheezing  Abd - Nondistended  Extremities - No edema,   Neurologic Exam - Awake, Alert  Psychiatric - Mood WNL     ASSESSMENT/PLAN  72y Female with impairments in mobility and ADLs   - Continue current rehabilitation program 3hrs a day   - Continue current medications, patient is medically stable - added Melatonin 5mg HS (6/3)  - DVT prophylaxis  - Skin - OOB and mobilization daily

## 2023-06-03 NOTE — PROGRESS NOTE ADULT - SUBJECTIVE AND OBJECTIVE BOX
Patient is a 72y old  Female who presents with a chief complaint of Debility (03 Jun 2023 07:06)      Patient seen and examined at bedside.    ALLERGIES:  No Known Allergies    MEDICATIONS  (STANDING):  enoxaparin Injectable 40 milliGRAM(s) SubCutaneous every 24 hours  folic acid 1 milliGRAM(s) Oral daily  losartan 50 milliGRAM(s) Oral daily  melatonin 5 milliGRAM(s) Oral at bedtime  thiamine 100 milliGRAM(s) Oral daily    MEDICATIONS  (PRN):  loperamide 2 milliGRAM(s) Oral every 6 hours PRN Diarrhea    Vital Signs Last 24 Hrs  T(F): 98.1 (02 Jun 2023 20:29), Max: 98.1 (02 Jun 2023 20:29)  HR: 86 (03 Jun 2023 05:32) (76 - 86)  BP: 147/82 (03 Jun 2023 05:32) (135/77 - 147/82)  RR: 16 (03 Jun 2023 05:32) (16 - 16)  SpO2: 96% (03 Jun 2023 05:32) (95% - 96%)  I&O's Summary    BMI (kg/m2): 26.4 (06-01-23 @ 20:37)  PHYSICAL EXAM:  General: NAD, A/O x 3  ENT: MMM, no scleral icterus  Neck: Supple, No JVD, no thyroidomegaly  Lungs: Clear to auscultation bilaterally, no wheezes, no rales, no rhonchi, good inspiratory effort  Cardio: RRR, S1/S2, No murmurs  Abdomen: Soft, Nontender, Nondistended; Bowel sounds present  Extremities: No calf tenderness, No pitting edema, no skin changes    LABS:                        10.2   4.74  )-----------( 161      ( 02 Jun 2023 06:04 )             31.1       06-02    133  |  97  |  8   ----------------------------<  95  4.8   |  29  |  0.66    Ca    9.4      02 Jun 2023 06:04  Phos  4.1     06-01  Mg     1.80     06-01    TPro  6.8  /  Alb  3.4  /  TBili  0.9  /  DBili  x   /  AST  107  /  ALT  148  /  AlkPhos  144  06-02     Culture - Urine (collected 28 May 2023 06:20)  Source: Clean Catch Clean Catch (Midstream)  Final Report (30 May 2023 20:14):    >100,000 CFU/ml Klebsiella pneumoniae  Organism: Klebsiella pneumoniae (30 May 2023 20:14)  Organism: Klebsiella pneumoniae (30 May 2023 20:14)      Method Type: LUKE      -  Amikacin: S <=16      -  Amoxicillin/Clavulanic Acid: S <=8/4      -  Ampicillin: R >16 These ampicillin results predict results for amoxicillin      -  Ampicillin/Sulbactam: S <=4/2 Enterobacter, Klebsiella aerogenes, Citrobacter, and Serratia may develop resistance during prolonged therapy (3-4 days)      -  Aztreonam: S <=4      -  Cefazolin: S <=2 For uncomplicated UTI with K. pneumoniae, E. coli, or P. mirablis: LUKE <=16 is sensitive and LUKE >=32 is resistant. This also predicts results for oral agents cefaclor, cefdinir, cefpodoxime, cefprozil, cefuroxime axetil, cephalexin and locarbef for uncomplicated UTI. Note that some isolates may be susceptible to these agents while testing resistant to cefazolin.      -  Cefepime: S <=2      -  Cefoxitin: S <=8      -  Ceftriaxone: S <=1 Enterobacter, Klebsiella aerogenes, Citrobacter, and Serratia may develop resistance during prolonged therapy      -  Cefuroxime: S <=4      -  Ciprofloxacin: S <=0.25      -  Ertapenem: S <=0.5      -  Gentamicin: S <=2      -  Imipenem: S <=1      -  Levofloxacin: S <=0.5      -  Meropenem: S <=1      -  Nitrofurantoin: S <=32 Should not be used to treat pyelonephritis      -  Piperacillin/Tazobactam: S <=8      -  Tobramycin: S <=2      -  Trimethoprim/Sulfamethoxazole: S <=0.5/9.5      COVID-19 PCR: NotDetec (05-31-23 @ 16:45)    COVID-19 PCR: NotDetec (05-31-23 @ 16:45)

## 2023-06-04 PROCEDURE — 99232 SBSQ HOSP IP/OBS MODERATE 35: CPT

## 2023-06-04 RX ORDER — ACETAMINOPHEN 500 MG
650 TABLET ORAL EVERY 6 HOURS
Refills: 0 | Status: DISCONTINUED | OUTPATIENT
Start: 2023-06-04 | End: 2023-06-06

## 2023-06-04 RX ADMIN — Medication 650 MILLIGRAM(S): at 02:31

## 2023-06-04 RX ADMIN — Medication 100 MILLIGRAM(S): at 13:08

## 2023-06-04 RX ADMIN — Medication 1 DROP(S): at 23:36

## 2023-06-04 RX ADMIN — Medication 1 MILLIGRAM(S): at 13:08

## 2023-06-04 RX ADMIN — LOSARTAN POTASSIUM 50 MILLIGRAM(S): 100 TABLET, FILM COATED ORAL at 05:18

## 2023-06-04 RX ADMIN — Medication 6 MILLIGRAM(S): at 22:00

## 2023-06-04 RX ADMIN — Medication 650 MILLIGRAM(S): at 22:01

## 2023-06-04 RX ADMIN — ENOXAPARIN SODIUM 40 MILLIGRAM(S): 100 INJECTION SUBCUTANEOUS at 05:17

## 2023-06-04 NOTE — PROGRESS NOTE ADULT - SUBJECTIVE AND OBJECTIVE BOX
Awake/Alert Slept well with melatonin.  Would like Advil for her right knee pain as it helped her yesterday. Reviewing the chart, it was Tylenol that she received.   No other new ROS.  Has been tolerating rehabilitation program.    VITALS  T(C): 36.7 (23 @ 20:42), Max: 36.7 (23 @ 20:42)  HR: 67 (23 @ 05:16) (67 - 68)  BP: 116/67 (23 @ 05:16) (116/67 - 146/81)  RR: 16 (23 @ 20:42) (16 - 16)  SpO2: 97% (23 @ 20:42) (97% - 97%)  Wt(kg): --     MEDICATIONS   enoxaparin Injectable 40 milliGRAM(s) every 24 hours  folic acid 1 milliGRAM(s) daily  lidocaine   4% Patch 1 Patch daily  loperamide 2 milliGRAM(s) every 6 hours PRN  losartan 50 milliGRAM(s) daily  melatonin 6 milliGRAM(s) at bedtime  thiamine 100 milliGRAM(s) daily      RECENT LABS/IMAGING            Urinalysis Basic - ( 2023 13:40 )    Color: Yellow / Appearance: Clear / S.007 / pH: x  Gluc: x / Ketone: Negative mg/dL  / Bili: Negative / Urobili: 0.2 mg/dL   Blood: x / Protein: Negative mg/dL / Nitrite: Negative   Leuk Esterase: Negative / RBC: x / WBC x   Sq Epi: x / Non Sq Epi: x / Bacteria: x             ------------------------------------------  PHYSICAL EXAM  Constitutional - NAD, Comfortable  Pulm - Breathing comfortably, No wheezing  Abd - Nondistended  Extremities - No edema, No right knee effusion  Neurologic Exam - Awake, Alert  Psychiatric - Mood WNL     ASSESSMENT/PLAN  72y Female with impairments in mobility and ADLs   - Continue current rehabilitation program 3hrs a day   - Continue current medications, patient is medically stable - added Tylenol PRN for pain  - DVT prophylaxis  - Skin - OOB and mobilization daily

## 2023-06-04 NOTE — PROGRESS NOTE ADULT - ASSESSMENT
71 YO F with PMH of HTN, anxiety, and EtOH dependence who presents to [facility] with recurrent fall found to have have Wernicke-Korsakoff, hyponatremia, UTI,  and impaired short term memory. Patient now with gait Instability, ADL impairments and Functional impairments.    #Debility 2/2 alcohol use disorder  #Alcohol Use Disorder  #Wernicke-Korsakoff   #Anxiety   - Folic acid 1 mg PO daily   - Thiamine   #Macrocytic anemia  - Will monitor Hg.Hct, transfuse if Hg <7  - Noted iron panel, B12 and folate WNL    #Hyponatremia  - Noted Na 133, will monitor for now    #HTN  - Losartan 50mg daily    #Transaminitis  - RUQ sono with hepatic steatosis    #DVT ppx:  Lovenox

## 2023-06-04 NOTE — PROGRESS NOTE ADULT - SUBJECTIVE AND OBJECTIVE BOX
Patient is a 72y old  Female who presents with a chief complaint of Debility    Reports feeling well  Denies chest pain, SOB  Patient seen and examined at bedside.    ALLERGIES:  No Known Allergies    MEDICATIONS  (STANDING):  enoxaparin Injectable 40 milliGRAM(s) SubCutaneous every 24 hours  folic acid 1 milliGRAM(s) Oral daily  losartan 50 milliGRAM(s) Oral daily  melatonin 5 milliGRAM(s) Oral at bedtime  thiamine 100 milliGRAM(s) Oral daily    MEDICATIONS  (PRN):  loperamide 2 milliGRAM(s) Oral every 6 hours PRN Diarrhea    Vital Signs Last 24 Hrs  T(C): 37.1 (04 Jun 2023 07:30), Max: 37.1 (04 Jun 2023 07:30)  T(F): 98.7 (04 Jun 2023 07:30), Max: 98.7 (04 Jun 2023 07:30)  HR: 69 (04 Jun 2023 07:30) (67 - 69)  BP: 125/76 (04 Jun 2023 07:30) (116/67 - 146/81)  RR: 16 (04 Jun 2023 07:30) (16 - 16)  SpO2: 97% (04 Jun 2023 07:30) (97% - 97%)    Parameters below as of 04 Jun 2023 07:30  Patient On (Oxygen Delivery Method): room air    BMI (kg/m2): 26.4 (06-01-23 @ 20:37)  PHYSICAL EXAM:  General: NAD, A/O x 3  ENT: MMM, no scleral icterus  Neck: Supple, No JVD, no thyroidomegaly  Lungs: Clear to auscultation bilaterally, no wheezes, no rales, no rhonchi, good inspiratory effort  Cardio: RRR, S1/S2, No murmurs  Abdomen: Soft, Nontender, Nondistended; Bowel sounds present  Extremities: No calf tenderness, No pitting edema, no skin changes    LABS:                        10.2   4.74  )-----------( 161      ( 02 Jun 2023 06:04 )             31.1       06-02    133  |  97  |  8   ----------------------------<  95  4.8   |  29  |  0.66    Ca    9.4      02 Jun 2023 06:04  Phos  4.1     06-01  Mg     1.80     06-01    TPro  6.8  /  Alb  3.4  /  TBili  0.9  /  DBili  x   /  AST  107  /  ALT  148  /  AlkPhos  144  06-02     Culture - Urine (collected 28 May 2023 06:20)  Source: Clean Catch Clean Catch (Midstream)  Final Report (30 May 2023 20:14):    >100,000 CFU/ml Klebsiella pneumoniae  Organism: Klebsiella pneumoniae (30 May 2023 20:14)  Organism: Klebsiella pneumoniae (30 May 2023 20:14)      Method Type: LUKE      -  Amikacin: S <=16      -  Amoxicillin/Clavulanic Acid: S <=8/4      -  Ampicillin: R >16 These ampicillin results predict results for amoxicillin      -  Ampicillin/Sulbactam: S <=4/2 Enterobacter, Klebsiella aerogenes, Citrobacter, and Serratia may develop resistance during prolonged therapy (3-4 days)      -  Aztreonam: S <=4      -  Cefazolin: S <=2 For uncomplicated UTI with K. pneumoniae, E. coli, or P. mirablis: LUKE <=16 is sensitive and LUKE >=32 is resistant. This also predicts results for oral agents cefaclor, cefdinir, cefpodoxime, cefprozil, cefuroxime axetil, cephalexin and locarbef for uncomplicated UTI. Note that some isolates may be susceptible to these agents while testing resistant to cefazolin.      -  Cefepime: S <=2      -  Cefoxitin: S <=8      -  Ceftriaxone: S <=1 Enterobacter, Klebsiella aerogenes, Citrobacter, and Serratia may develop resistance during prolonged therapy      -  Cefuroxime: S <=4      -  Ciprofloxacin: S <=0.25      -  Ertapenem: S <=0.5      -  Gentamicin: S <=2      -  Imipenem: S <=1      -  Levofloxacin: S <=0.5      -  Meropenem: S <=1      -  Nitrofurantoin: S <=32 Should not be used to treat pyelonephritis      -  Piperacillin/Tazobactam: S <=8      -  Tobramycin: S <=2      -  Trimethoprim/Sulfamethoxazole: S <=0.5/9.5      COVID-19 PCR: NotDetec (05-31-23 @ 16:45)  COVID-19 PCR: NotDetec (05-31-23 @ 16:45)

## 2023-06-05 LAB
ALBUMIN SERPL ELPH-MCNC: 3 G/DL — LOW (ref 3.3–5)
ALP SERPL-CCNC: 126 U/L — HIGH (ref 40–120)
ALT FLD-CCNC: 118 U/L — HIGH (ref 10–45)
ANION GAP SERPL CALC-SCNC: 8 MMOL/L — SIGNIFICANT CHANGE UP (ref 5–17)
AST SERPL-CCNC: 70 U/L — HIGH (ref 10–40)
BILIRUB SERPL-MCNC: 0.4 MG/DL — SIGNIFICANT CHANGE UP (ref 0.2–1.2)
BUN SERPL-MCNC: 10 MG/DL — SIGNIFICANT CHANGE UP (ref 7–23)
CALCIUM SERPL-MCNC: 9.2 MG/DL — SIGNIFICANT CHANGE UP (ref 8.4–10.5)
CHLORIDE SERPL-SCNC: 97 MMOL/L — SIGNIFICANT CHANGE UP (ref 96–108)
CO2 SERPL-SCNC: 28 MMOL/L — SIGNIFICANT CHANGE UP (ref 22–31)
CREAT SERPL-MCNC: 0.55 MG/DL — SIGNIFICANT CHANGE UP (ref 0.5–1.3)
EGFR: 99 ML/MIN/1.73M2 — SIGNIFICANT CHANGE UP
GLUCOSE SERPL-MCNC: 139 MG/DL — HIGH (ref 70–99)
HCT VFR BLD CALC: 30 % — LOW (ref 34.5–45)
HGB BLD-MCNC: 9.9 G/DL — LOW (ref 11.5–15.5)
MCHC RBC-ENTMCNC: 33 GM/DL — SIGNIFICANT CHANGE UP (ref 32–36)
MCHC RBC-ENTMCNC: 34.4 PG — HIGH (ref 27–34)
MCV RBC AUTO: 104.2 FL — HIGH (ref 80–100)
NRBC # BLD: 0 /100 WBCS — SIGNIFICANT CHANGE UP (ref 0–0)
PLATELET # BLD AUTO: 322 K/UL — SIGNIFICANT CHANGE UP (ref 150–400)
POTASSIUM SERPL-MCNC: 4.5 MMOL/L — SIGNIFICANT CHANGE UP (ref 3.5–5.3)
POTASSIUM SERPL-SCNC: 4.5 MMOL/L — SIGNIFICANT CHANGE UP (ref 3.5–5.3)
PROT SERPL-MCNC: 6.5 G/DL — SIGNIFICANT CHANGE UP (ref 6–8.3)
RBC # BLD: 2.88 M/UL — LOW (ref 3.8–5.2)
RBC # FLD: 13.1 % — SIGNIFICANT CHANGE UP (ref 10.3–14.5)
SODIUM SERPL-SCNC: 133 MMOL/L — LOW (ref 135–145)
WBC # BLD: 4.55 K/UL — SIGNIFICANT CHANGE UP (ref 3.8–10.5)
WBC # FLD AUTO: 4.55 K/UL — SIGNIFICANT CHANGE UP (ref 3.8–10.5)

## 2023-06-05 PROCEDURE — 99232 SBSQ HOSP IP/OBS MODERATE 35: CPT

## 2023-06-05 PROCEDURE — 99232 SBSQ HOSP IP/OBS MODERATE 35: CPT | Mod: GC

## 2023-06-05 RX ADMIN — Medication 650 MILLIGRAM(S): at 22:19

## 2023-06-05 RX ADMIN — Medication 100 MILLIGRAM(S): at 11:31

## 2023-06-05 RX ADMIN — Medication 6 MILLIGRAM(S): at 22:19

## 2023-06-05 RX ADMIN — Medication 1 MILLIGRAM(S): at 11:31

## 2023-06-05 RX ADMIN — ENOXAPARIN SODIUM 40 MILLIGRAM(S): 100 INJECTION SUBCUTANEOUS at 05:58

## 2023-06-05 RX ADMIN — Medication 650 MILLIGRAM(S): at 23:19

## 2023-06-05 RX ADMIN — LIDOCAINE 1 PATCH: 4 CREAM TOPICAL at 00:01

## 2023-06-05 RX ADMIN — LOSARTAN POTASSIUM 50 MILLIGRAM(S): 100 TABLET, FILM COATED ORAL at 05:58

## 2023-06-05 RX ADMIN — LIDOCAINE 1 PATCH: 4 CREAM TOPICAL at 19:03

## 2023-06-05 NOTE — PROGRESS NOTE ADULT - SUBJECTIVE AND OBJECTIVE BOX
HPI:  This is a 71 YO F  with PMH of HTN, anxiety, and EtOH dependence (drinks 8-10 oz vodka daily for 40+ years), admitted for gait instability and recurrent falls for past 6 months. CTH wnl, BMP notable for hyponatremia to 122 likely 2/2 "beer potomania."  Patient was placed on CIWA and IVF with continuous NS. Patient seen by PT, who recommended rolling walker. Patient not interested in stopping drinking because she likes it.  Course also c/b UTI with Kleb pneumo treated with ceftriaxone x3d.      On admission, patient noted to have symptoms c/f Wernicke-Korsakoff: lateral nystagmus (with rightward gaze), disorientation (was not aware that she had been staying in same room the entire admission), and wide-based ataxia.  Also noted to have confabulations (would describe coordinating events with nighttime nursing staff) and impaired short term memory (forgot conversations, people, etc from day prior).  Patient was given IV thiamine, B9, B12, multivitamins, and electrolytes repleted. CTH negative.    Patient was evaluated by PM&R and therapy for functional deficits, gait/ADL impairments and acute rehabilitation was recommended. Patient was medically optimized for discharge to [facility] IRU on 23.    NAD  Advil for her right knee pain.   No other new ROS.  Has been tolerating rehabilitation program.      VITALS  Vital Signs Last 24 Hrs  T(C): 36.7 (2023 08:32), Max: 37.4 (2023 22:03)  T(F): 98.1 (2023 08:32), Max: 99.3 (2023 22:03)  HR: 78 (2023 08:32) (78 - 90)  BP: 113/71 (2023 08:32) (112/71 - 127/68)  BP(mean): --  RR: 14 (2023 08:32) (14 - 16)  SpO2: 98% (2023 08:32) (94% - 98%)    Parameters below as of 2023 08:32  Patient On (Oxygen Delivery Method): room air      RECENT LABS                        9.9    4.55  )-----------( 322      ( 2023 07:42 )             30.0     06-05    133<L>  |  97  |  10  ----------------------------<  139<H>  4.5   |  28  |  0.55    Ca    9.2      2023 07:42    TPro  6.5  /  Alb  3.0<L>  /  TBili  0.4  /  DBili  x   /  AST  70<H>  /  ALT  118<H>  /  AlkPhos  126<H>  06-05      LIVER FUNCTIONS - ( 2023 07:42 )  Alb: 3.0 g/dL / Pro: 6.5 g/dL / ALK PHOS: 126 U/L / ALT: 118 U/L / AST: 70 U/L / GGT: x           Urinalysis Basic - ( 2023 13:40 )    Color: Yellow / Appearance: Clear / S.007 / pH: x  Gluc: x / Ketone: Negative mg/dL  / Bili: Negative / Urobili: 0.2 mg/dL   Blood: x / Protein: Negative mg/dL / Nitrite: Negative   Leuk Esterase: Negative / RBC: x / WBC x   Sq Epi: x / Non Sq Epi: x / Bacteria: x      CURRENT MEDICATIONS  MEDICATIONS  (STANDING):  enoxaparin Injectable 40 milliGRAM(s) SubCutaneous every 24 hours  folic acid 1 milliGRAM(s) Oral daily  lidocaine   4% Patch 1 Patch Transdermal daily  losartan 50 milliGRAM(s) Oral daily  melatonin 6 milliGRAM(s) Oral at bedtime  thiamine 100 milliGRAM(s) Oral daily    MEDICATIONS  (PRN):  acetaminophen     Tablet .. 650 milliGRAM(s) Oral every 6 hours PRN Mild Pain (1 - 3)  loperamide 2 milliGRAM(s) Oral every 6 hours PRN Diarrhea    Physical Exam: Constitutional - NAD, Comfortable, Affable   HEENT - NCAT, EOMI  Chest - Respirations non-distressed, lungs CTAB  Cardio - RRR, no murmur, no peripheral edema   Abdomen -  Soft, NTND  Neurologic Exam: a&o x3                                   LEFT    UE - ShAB 4/5, EF 5/5, EE 4/5, WE 5/5,  WNL                    RIGHT UE - ShAB 4/5, EF 5/5, EE 4/5, WE 5/5,  WNL                    LEFT    LE - HF 5/5, KE 5/5, DF 5/5, PF 5/5                    RIGHT LE - HF 5/5, KE 5/5, DF 5/5, PF 5/5        Sensory - Intact to LT bilateral     Coordination - Dysmetria      OculoVestibular -  + horizontal nystagmus to left  Psychiatric - Affect wnl   Skin on admission: Unremarkable    Continue comprehensive acute rehab program consisting of 3hrs/day of OT/PT and SLP.

## 2023-06-05 NOTE — SBIRT NOTE ADULT - NSSBIRTBRIEFINTDET_GEN_A_CORE
Patient states she has 2-4 Vodka soda drinks per night, denies excessive use. SW educated on purpose of SBIRT and general guidelines on recommendation of ETOH limitation for health and well-being. Patient states that she plans to cut down on drinking, but declines resources for alcohol use treatment at this time.

## 2023-06-05 NOTE — PROGRESS NOTE ADULT - SUBJECTIVE AND OBJECTIVE BOX
Patient is a 72y old  Female who presents with a chief complaint of Debility    Reports feeling well  Denies chest pain, SOB  Patient seen and examined at bedside.    ALLERGIES:  No Known Allergies    MEDICATIONS  (STANDING):  enoxaparin Injectable 40 milliGRAM(s) SubCutaneous every 24 hours  folic acid 1 milliGRAM(s) Oral daily  losartan 50 milliGRAM(s) Oral daily  melatonin 5 milliGRAM(s) Oral at bedtime  thiamine 100 milliGRAM(s) Oral daily    MEDICATIONS  (PRN):  loperamide 2 milliGRAM(s) Oral every 6 hours PRN Diarrhea    Vital Signs Last 24 Hrs  T(C): 36.7 (05 Jun 2023 08:32), Max: 37.4 (04 Jun 2023 22:03)  T(F): 98.1 (05 Jun 2023 08:32), Max: 99.3 (04 Jun 2023 22:03)  HR: 78 (05 Jun 2023 08:32) (78 - 90)  BP: 113/71 (05 Jun 2023 08:32) (112/71 - 127/68)  RR: 14 (05 Jun 2023 08:32) (14 - 16)  SpO2: 98% (05 Jun 2023 08:32) (94% - 98%)    Parameters below as of 05 Jun 2023 08:32  Patient On (Oxygen Delivery Method): room air    BMI (kg/m2): 26.4 (06-01-23 @ 20:37)  PHYSICAL EXAM:  General: NAD, A/O x 3  ENT: MMM, no scleral icterus  Neck: Supple, No JVD, no thyroidomegaly  Lungs: Clear to auscultation bilaterally, no wheezes, no rales, no rhonchi, good inspiratory effort  Cardio: RRR, S1/S2, No murmurs  Abdomen: Soft, Nontender, Nondistended; Bowel sounds present  Extremities: No calf tenderness, No pitting edema, no skin changes    LABS:                        10.2   4.74  )-----------( 161      ( 02 Jun 2023 06:04 )             31.1       06-02    133  |  97  |  8   ----------------------------<  95  4.8   |  29  |  0.66    Ca    9.4      02 Jun 2023 06:04  Phos  4.1     06-01  Mg     1.80     06-01    TPro  6.8  /  Alb  3.4  /  TBili  0.9  /  DBili  x   /  AST  107  /  ALT  148  /  AlkPhos  144  06-02     Culture - Urine (collected 28 May 2023 06:20)  Source: Clean Catch Clean Catch (Midstream)  Final Report (30 May 2023 20:14):    >100,000 CFU/ml Klebsiella pneumoniae  Organism: Klebsiella pneumoniae (30 May 2023 20:14)  Organism: Klebsiella pneumoniae (30 May 2023 20:14)      Method Type: LUKE      -  Amikacin: S <=16      -  Amoxicillin/Clavulanic Acid: S <=8/4      -  Ampicillin: R >16 These ampicillin results predict results for amoxicillin      -  Ampicillin/Sulbactam: S <=4/2 Enterobacter, Klebsiella aerogenes, Citrobacter, and Serratia may develop resistance during prolonged therapy (3-4 days)      -  Aztreonam: S <=4      -  Cefazolin: S <=2 For uncomplicated UTI with K. pneumoniae, E. coli, or P. mirablis: LUKE <=16 is sensitive and LUKE >=32 is resistant. This also predicts results for oral agents cefaclor, cefdinir, cefpodoxime, cefprozil, cefuroxime axetil, cephalexin and locarbef for uncomplicated UTI. Note that some isolates may be susceptible to these agents while testing resistant to cefazolin.      -  Cefepime: S <=2      -  Cefoxitin: S <=8      -  Ceftriaxone: S <=1 Enterobacter, Klebsiella aerogenes, Citrobacter, and Serratia may develop resistance during prolonged therapy      -  Cefuroxime: S <=4      -  Ciprofloxacin: S <=0.25      -  Ertapenem: S <=0.5      -  Gentamicin: S <=2      -  Imipenem: S <=1      -  Levofloxacin: S <=0.5      -  Meropenem: S <=1      -  Nitrofurantoin: S <=32 Should not be used to treat pyelonephritis      -  Piperacillin/Tazobactam: S <=8      -  Tobramycin: S <=2      -  Trimethoprim/Sulfamethoxazole: S <=0.5/9.5      COVID-19 PCR: NotDetec (05-31-23 @ 16:45)  COVID-19 PCR: NotDetec (05-31-23 @ 16:45)

## 2023-06-05 NOTE — PROGRESS NOTE ADULT - ASSESSMENT
73 YO F with PMH of HTN, anxiety, and EtOH dependence who presents to [facility] with recurrent fall found to have have Wernicke-Korsakoff, hyponatremia, UTI,  and impaired short term memory. Patient now with gait Instability, ADL impairments and Functional impairments.    #Debility 2/2 alcohol use disorder  - Comprehensive Rehab Program: PT/OT/ST, 3hours daily and 5 days weekly  - PT: Focused on improving strength, endurance, coordination, balance, functional mobility, and transfers  - OT: Focused on improving strength, fine motor skills, coordination, posture and ADLs.    - ST: Diagnose and treat deficits in swallowing, cognition and communication.     #Alcohol Use Disorder  #Wernicke-Korsakoff   #Anxiety   - Folic acid 1 mg PO daily   - Thiamine   - Nutritionist consult    #HTN  - Losartan 50mg daily    #Transaminitis  - RUQ sono with hepatic steatosis    #DVT ppx  - Lovenox, SCD, TEDs    #Bowel Regimen  - On hold w/ regular BMs  - imidium 2mg Q 6 hrs PRN    #Bladder management  - Voiding independently   - BS on admission, and q8h PRN  - Monitor UO    #FEN   - Diet: DASH    #Precaution  - Fall, Aspiration, Fracture     #GOC  CODE STATUS: FULL CODE    Outpatient Follow-up (Specialty/Name of physician):    Ariana Beard  Family Medicine  [address] [place] [id number]  Phone: [phone]  Fax: [phone]  Established Patient  Follow Up Time: 1 week  Follow-up [facility]  Psychiatry  [address] [place] [address]  Phone: [phone]  Follow Up Time: 2 weeks

## 2023-06-05 NOTE — PROGRESS NOTE ADULT - NS ATTEND AMEND GEN_ALL_CORE FT
Rehab Attending- Patient seen and examined by me - Case discussed, above note reviewed by me with modifications made    patient seen and evaluated bedside   no issues over weekend  ambulates in room HHA  labs reviewed - stable  to continue intensive rehab program        VITALS  Vital Signs Last 24 Hrs  T(C): 36.7 (05 Jun 2023 08:32), Max: 37.4 (04 Jun 2023 22:03)  T(F): 98.1 (05 Jun 2023 08:32), Max: 99.3 (04 Jun 2023 22:03)  HR: 78 (05 Jun 2023 08:32) (78 - 90)  BP: 113/71 (05 Jun 2023 08:32) (112/71 - 127/68)  BP(mean): --  RR: 14 (05 Jun 2023 08:32) (14 - 16)  SpO2: 98% (05 Jun 2023 08:32) (94% - 98%)    Parameters below as of 05 Jun 2023 08:32  Patient On (Oxygen Delivery Method): room air      Physical Exam: Constitutional - NAD, Comfortable, Affable   HEENT - NCAT, EOMI  Chest - Respirations non-distressed, lungs CTAB  Cardio - RRR, no murmur, no peripheral edema   Abdomen -  Soft, NTND  Neurologic Exam: a&o x3                                   LEFT    UE - ShAB 4/5, EF 5/5, EE 4/5, WE 5/5,  WNL                    RIGHT UE - ShAB 4/5, EF 5/5, EE 4/5, WE 5/5,  WNL                    LEFT    LE - HF 5/5, KE 5/5, DF 5/5, PF 5/5                    RIGHT LE - HF 5/5, KE 5/5, DF 5/5, PF 5/5        Sensory - Intact to LT bilateral     Coordination - Dysmetria      OculoVestibular -  + horizontal nystagmus to left  Psychiatric - Affect wnl   Skin on admission: Unremarkable

## 2023-06-05 NOTE — PROGRESS NOTE ADULT - ASSESSMENT
73 YO F with PMH of HTN, anxiety, and EtOH dependence who presents to [facility] with recurrent fall found to have have Wernicke-Korsakoff, hyponatremia, UTI,  and impaired short term memory. Patient now with gait Instability, ADL impairments and Functional impairments.    #Debility 2/2 alcohol use disorder  #Alcohol Use Disorder  #Wernicke-Korsakoff   #Anxiety   - Folic acid 1 mg PO daily   - Thiamine     #Macrocytic anemia  - Will monitor Hg.Hct, transfuse if Hg <7  - Noted iron panel, B12 and folate WNL    #Hyponatremia  - Noted Na 133, will monitor for now    #HTN  - Losartan 50mg daily    #Transaminitis  - RUQ sono with hepatic steatosis    #DVT ppx:  Lovenox

## 2023-06-06 PROCEDURE — 99232 SBSQ HOSP IP/OBS MODERATE 35: CPT

## 2023-06-06 RX ORDER — ACETAMINOPHEN 500 MG
650 TABLET ORAL EVERY 12 HOURS
Refills: 0 | Status: DISCONTINUED | OUTPATIENT
Start: 2023-06-06 | End: 2023-06-09

## 2023-06-06 RX ADMIN — ENOXAPARIN SODIUM 40 MILLIGRAM(S): 100 INJECTION SUBCUTANEOUS at 06:04

## 2023-06-06 RX ADMIN — Medication 650 MILLIGRAM(S): at 22:14

## 2023-06-06 RX ADMIN — LOSARTAN POTASSIUM 50 MILLIGRAM(S): 100 TABLET, FILM COATED ORAL at 06:04

## 2023-06-06 RX ADMIN — LIDOCAINE 1 PATCH: 4 CREAM TOPICAL at 07:46

## 2023-06-06 RX ADMIN — Medication 1 MILLIGRAM(S): at 13:18

## 2023-06-06 RX ADMIN — Medication 6 MILLIGRAM(S): at 21:44

## 2023-06-06 RX ADMIN — Medication 650 MILLIGRAM(S): at 21:44

## 2023-06-06 RX ADMIN — Medication 100 MILLIGRAM(S): at 13:17

## 2023-06-06 RX ADMIN — LIDOCAINE 1 PATCH: 4 CREAM TOPICAL at 07:00

## 2023-06-06 NOTE — PROGRESS NOTE ADULT - SUBJECTIVE AND OBJECTIVE BOX
HPI:  This is a 73 YO F  with PMH of HTN, anxiety, and EtOH dependence (drinks 8-10 oz vodka daily for 40+ years), admitted for gait instability and recurrent falls for past 6 months. CTH wnl, BMP notable for hyponatremia to 122 likely 2/2 "beer potomania."  Patient was placed on CIWA and IVF with continuous NS. Patient seen by PT, who recommended rolling walker. Patient not interested in stopping drinking because she likes it.  Course also c/b UTI with Kleb pneumo treated with ceftriaxone x3d.      On admission, patient noted to have symptoms c/f Wernicke-Korsakoff: lateral nystagmus (with rightward gaze), disorientation (was not aware that she had been staying in same room the entire admission), and wide-based ataxia.  Also noted to have confabulations (would describe coordinating events with nighttime nursing staff) and impaired short term memory (forgot conversations, people, etc from day prior).  Patient was given IV thiamine, B9, B12, multivitamins, and electrolytes repleted. CTH negative.    Patient was evaluated by PM&R and therapy for functional deficits, gait/ADL impairments and acute rehabilitation was recommended. Patient was medically optimized for discharge to [facility] IRU on 6/1/23.       NAD overnight, no sz, no HA, no weakness. balance is impaired. Alert and awake at baseline. In good spirits.  patch for right knee pain-chronic.   No other new ROS.  Has been tolerating rehabilitation program.       VITALS  Vital Signs Last 24 Hrs  T(C): 37.2 (06 Jun 2023 07:30), Max: 37.2 (06 Jun 2023 07:30)  T(F): 98.9 (06 Jun 2023 07:30), Max: 98.9 (06 Jun 2023 07:30)  HR: 77 (06 Jun 2023 07:30) (69 - 77)  BP: 119/72 (06 Jun 2023 07:30) (119/72 - 162/87)  BP(mean): --  RR: 16 (06 Jun 2023 07:30) (16 - 16)  SpO2: 97% (06 Jun 2023 07:30) (95% - 97%)    Parameters below as of 06 Jun 2023 07:30  Patient On (Oxygen Delivery Method): room air       RECENT LABS                        9.9    4.55  )-----------( 322      ( 05 Jun 2023 07:42 )             30.0     06-05    133<L>  |  97  |  10  ----------------------------<  139<H>  4.5   |  28  |  0.55    Ca    9.2      05 Jun 2023 07:42    TPro  6.5  /  Alb  3.0<L>  /  TBili  0.4  /  DBili  x   /  AST  70<H>  /  ALT  118<H>  /  AlkPhos  126<H>  06-05      LIVER FUNCTIONS - ( 05 Jun 2023 07:42 )  Alb: 3.0 g/dL / Pro: 6.5 g/dL / ALK PHOS: 126 U/L / ALT: 118 U/L / AST: 70 U/L / GGT: x               CURRENT MEDICATIONS  MEDICATIONS  (STANDING):  enoxaparin Injectable 40 milliGRAM(s) SubCutaneous every 24 hours  folic acid 1 milliGRAM(s) Oral daily  lidocaine   4% Patch 1 Patch Transdermal daily  losartan 50 milliGRAM(s) Oral daily  melatonin 6 milliGRAM(s) Oral at bedtime  thiamine 100 milliGRAM(s) Oral daily    MEDICATIONS  (PRN):  acetaminophen     Tablet .. 650 milliGRAM(s) Oral every 6 hours PRN Mild Pain (1 - 3)  loperamide 2 milliGRAM(s) Oral every 6 hours PRN Diarrhea      Physical Exam: Constitutional - NAD, Comfortable, Affable   HEENT - NCAT, EOMI  Chest - Respirations non-distressed, lungs CTAB  Cardio - RRR, no murmur, no peripheral edema   Abdomen -  Soft, NTND  Neurologic Exam: a&o x3                                   LEFT    UE - ShAB 4/5, EF 5/5, EE 4/5, WE 5/5,  WNL                    RIGHT UE - ShAB 4/5, EF 5/5, EE 4/5, WE 5/5,  WNL                    LEFT    LE - HF 5/5, KE 5/5, DF 5/5, PF 5/5                    RIGHT LE - HF 5/5, KE 5/5, DF 5/5, PF 5/5        Sensory - Intact to LT bilateral     Coordination - Dysmetria      OculoVestibular -  + horizontal nystagmus to left  Psychiatric - Affect wnl   Skin on admission: Unremarkable    Continue comprehensive acute rehab program consisting of 3hrs/day of OT/PT and SLP 30min. HPI:  This is a 73 YO F  with PMH of HTN, anxiety, and EtOH dependence (drinks 8-10 oz vodka daily for 40+ years), admitted for gait instability and recurrent falls for past 6 months. CTH wnl, BMP notable for hyponatremia to 122 likely 2/2 "beer potomania."  Patient was placed on CIWA and IVF with continuous NS. Patient seen by PT, who recommended rolling walker. Patient not interested in stopping drinking because she likes it.  Course also c/b UTI with Kleb pneumo treated with ceftriaxone x3d.      On admission, patient noted to have symptoms c/f Wernicke-Korsakoff: lateral nystagmus (with rightward gaze), disorientation (was not aware that she had been staying in same room the entire admission), and wide-based ataxia.  Also noted to have confabulations (would describe coordinating events with nighttime nursing staff) and impaired short term memory (forgot conversations, people, etc from day prior).  Patient was given IV thiamine, B9, B12, multivitamins, and electrolytes repleted. CTH negative.    Patient was evaluated by PM&R and therapy for functional deficits, gait/ADL impairments and acute rehabilitation was recommended. Patient was medically optimized for discharge to [facility] IRU on 6/1/23.       NAD overnight, no sz, no HA, no weakness. balance is impaired. Alert and awake at baseline. In good spirits.  patch for right knee pain-chronic.   No other new ROS.  Has been tolerating rehabilitation program.       VITALS  Vital Signs Last 24 Hrs  T(C): 37.2 (06 Jun 2023 07:30), Max: 37.2 (06 Jun 2023 07:30)  T(F): 98.9 (06 Jun 2023 07:30), Max: 98.9 (06 Jun 2023 07:30)  HR: 77 (06 Jun 2023 07:30) (69 - 77)  BP: 119/72 (06 Jun 2023 07:30) (119/72 - 162/87)  BP(mean): --  RR: 16 (06 Jun 2023 07:30) (16 - 16)  SpO2: 97% (06 Jun 2023 07:30) (95% - 97%)    Parameters below as of 06 Jun 2023 07:30  Patient On (Oxygen Delivery Method): room air       RECENT LABS                        9.9    4.55  )-----------( 322      ( 05 Jun 2023 07:42 )             30.0     06-05    133<L>  |  97  |  10  ----------------------------<  139<H>  4.5   |  28  |  0.55    Ca    9.2      05 Jun 2023 07:42    TPro  6.5  /  Alb  3.0<L>  /  TBili  0.4  /  DBili  x   /  AST  70<H>  /  ALT  118<H>  /  AlkPhos  126<H>  06-05      LIVER FUNCTIONS - ( 05 Jun 2023 07:42 )  Alb: 3.0 g/dL / Pro: 6.5 g/dL / ALK PHOS: 126 U/L / ALT: 118 U/L / AST: 70 U/L / GGT: x               CURRENT MEDICATIONS  MEDICATIONS  (STANDING):  enoxaparin Injectable 40 milliGRAM(s) SubCutaneous every 24 hours  folic acid 1 milliGRAM(s) Oral daily  lidocaine   4% Patch 1 Patch Transdermal daily  losartan 50 milliGRAM(s) Oral daily  melatonin 6 milliGRAM(s) Oral at bedtime  thiamine 100 milliGRAM(s) Oral daily    MEDICATIONS  (PRN):  acetaminophen     Tablet .. 650 milliGRAM(s) Oral every 6 hours PRN Mild Pain (1 - 3)  loperamide 2 milliGRAM(s) Oral every 6 hours PRN Diarrhea      Physical Exam: Constitutional - NAD, Comfortable, Affable   HEENT - NCAT, EOMI  Chest - Respirations non-distressed, lungs CTAB  Cardio - RRR, no murmur, no peripheral edema   Abdomen -  Soft, NTND  Neurologic Exam: a&o x3                                   LEFT    UE - ShAB 4/5, EF 5/5, EE 4/5, WE 5/5,  WNL                    RIGHT UE - ShAB 4/5, EF 5/5, EE 4/5, WE 5/5,  WNL                    LEFT    LE - HF 5/5, KE 5/5, DF 5/5, PF 5/5                    RIGHT LE - HF 5/5, KE 5/5, DF 5/5, PF 5/5        Sensory - Intact to LT bilateral     Coordination - Dysmetria      OculoVestibular -  + horizontal nystagmus to left  Psychiatric - Affect wnl   Skin on admission: Unremarkable    IDT - 6/6       SW - one step to enter. 3 steps inside. Lives with   SP - Regular with thin. Some cognitive disfunction.   PT - CG. 200 feet CG without a device.   OT - e/g - setup. CG for the rest of the ADL's   Goal - Supervision.   TDD - 6/9 to home.

## 2023-06-06 NOTE — PROGRESS NOTE ADULT - SUBJECTIVE AND OBJECTIVE BOX
Patient is a 72y old  Female who presents with a chief complaint of Debility    Reports feeling well  Denies chest pain, SOB  Patient seen and examined at bedside.    ALLERGIES:  No Known Allergies    MEDICATIONS  (STANDING):  enoxaparin Injectable 40 milliGRAM(s) SubCutaneous every 24 hours  folic acid 1 milliGRAM(s) Oral daily  losartan 50 milliGRAM(s) Oral daily  melatonin 5 milliGRAM(s) Oral at bedtime  thiamine 100 milliGRAM(s) Oral daily    MEDICATIONS  (PRN):  loperamide 2 milliGRAM(s) Oral every 6 hours PRN Diarrhea    Vital Signs Last 24 Hrs  T(C): 37.2 (06 Jun 2023 07:30), Max: 37.2 (06 Jun 2023 07:30)  T(F): 98.9 (06 Jun 2023 07:30), Max: 98.9 (06 Jun 2023 07:30)  HR: 77 (06 Jun 2023 07:30) (69 - 77)  BP: 119/72 (06 Jun 2023 07:30) (119/72 - 162/87)  RR: 16 (06 Jun 2023 07:30) (16 - 16)  SpO2: 97% (06 Jun 2023 07:30) (95% - 97%)    Parameters below as of 06 Jun 2023 07:30  Patient On (Oxygen Delivery Method): room air    BMI (kg/m2): 26.4 (06-01-23 @ 20:37)  PHYSICAL EXAM:  General: NAD, A/O x 3  ENT: MMM, no scleral icterus  Neck: Supple, No JVD, no thyroidomegaly  Lungs: Clear to auscultation bilaterally, no wheezes, no rales, no rhonchi, good inspiratory effort  Cardio: RRR, S1/S2, No murmurs  Abdomen: Soft, Nontender, Nondistended; Bowel sounds present  Extremities: No calf tenderness, No pitting edema, no skin changes    LABS:                        10.2   4.74  )-----------( 161      ( 02 Jun 2023 06:04 )             31.1       06-02    133  |  97  |  8   ----------------------------<  95  4.8   |  29  |  0.66    Ca    9.4      02 Jun 2023 06:04  Phos  4.1     06-01  Mg     1.80     06-01    TPro  6.8  /  Alb  3.4  /  TBili  0.9  /  DBili  x   /  AST  107  /  ALT  148  /  AlkPhos  144  06-02     Culture - Urine (collected 28 May 2023 06:20)  Source: Clean Catch Clean Catch (Midstream)  Final Report (30 May 2023 20:14):    >100,000 CFU/ml Klebsiella pneumoniae  Organism: Klebsiella pneumoniae (30 May 2023 20:14)  Organism: Klebsiella pneumoniae (30 May 2023 20:14)      Method Type: LUKE      -  Amikacin: S <=16      -  Amoxicillin/Clavulanic Acid: S <=8/4      -  Ampicillin: R >16 These ampicillin results predict results for amoxicillin      -  Ampicillin/Sulbactam: S <=4/2 Enterobacter, Klebsiella aerogenes, Citrobacter, and Serratia may develop resistance during prolonged therapy (3-4 days)      -  Aztreonam: S <=4      -  Cefazolin: S <=2 For uncomplicated UTI with K. pneumoniae, E. coli, or P. mirablis: LUKE <=16 is sensitive and LUKE >=32 is resistant. This also predicts results for oral agents cefaclor, cefdinir, cefpodoxime, cefprozil, cefuroxime axetil, cephalexin and locarbef for uncomplicated UTI. Note that some isolates may be susceptible to these agents while testing resistant to cefazolin.      -  Cefepime: S <=2      -  Cefoxitin: S <=8      -  Ceftriaxone: S <=1 Enterobacter, Klebsiella aerogenes, Citrobacter, and Serratia may develop resistance during prolonged therapy      -  Cefuroxime: S <=4      -  Ciprofloxacin: S <=0.25      -  Ertapenem: S <=0.5      -  Gentamicin: S <=2      -  Imipenem: S <=1      -  Levofloxacin: S <=0.5      -  Meropenem: S <=1      -  Nitrofurantoin: S <=32 Should not be used to treat pyelonephritis      -  Piperacillin/Tazobactam: S <=8      -  Tobramycin: S <=2      -  Trimethoprim/Sulfamethoxazole: S <=0.5/9.5      COVID-19 PCR: NotDetec (05-31-23 @ 16:45)  COVID-19 PCR: NotDetec (05-31-23 @ 16:45)

## 2023-06-06 NOTE — PROGRESS NOTE ADULT - ASSESSMENT
71 YO F with PMH of HTN, anxiety, and EtOH dependence who presents to [facility] with recurrent fall found to have have Wernicke-Korsakoff, hyponatremia, UTI,  and impaired short term memory. Patient now with gait Instability, ADL impairments and Functional impairments.    #Debility 2/2 alcohol use disorder  - Comprehensive Rehab Program: PT/OT/ST, 3hours daily and 5 days weekly  - PT: Focused on improving strength, endurance, coordination, balance, functional mobility, and transfers  - OT: Focused on improving strength, fine motor skills, coordination, posture and ADLs.    - ST: Diagnose and treat deficits in swallowing, cognition and communication.     #Alcohol Use Disorder  #Wernicke-Korsakoff   #Anxiety   - Folic acid 1 mg PO daily   - Thiamine   - Nutritionist consult    #HTN  - Losartan 50mg daily    #Transaminitis  - RUQ sono with hepatic steatosis. Tylenol to avoid beyond BID.     #DVT ppx  - Lovenox, SCD, TEDs    #Bowel Regimen  - On hold w/ regular BMs  - imidium 2mg Q 6 hrs PRN    #Bladder management  - Voiding independently   - BS on admission, and q8h PRN  - Monitor UO    #FEN   - Diet: DASH    #Precaution  - Fall, Aspiration, Fracture     #GOC  CODE STATUS: FULL CODE    Outpatient Follow-up (Specialty/Name of physician):    Ariana Beard  Family Medicine  [address] [place] [address]  Phone: [phone]  Fax: [phone]  Established Patient  Follow Up Time: 1 week  Follow-up [facility] Psychiatry  Psychiatry  [address] [place] [address]  Phone: [phone]  Follow Up Time: 2 weeks

## 2023-06-06 NOTE — PROGRESS NOTE ADULT - ASSESSMENT
71 YO F with PMH of HTN, anxiety, and EtOH dependence who presents to [facility] with recurrent fall found to have have Wernicke-Korsakoff, hyponatremia, UTI,  and impaired short term memory. Patient now with gait Instability, ADL impairments and Functional impairments.    #Debility 2/2 alcohol use disorder  #Alcohol Use Disorder  #Wernicke-Korsakoff   #Anxiety   - Folic acid 1 mg PO daily   - Thiamine     #Macrocytic anemia  - Will monitor Hg.Hct, transfuse if Hg <7  - Noted iron panel, B12 and folate WNL    #Hyponatremia  - Noted Na 133, will monitor for now    #HTN  - Losartan 50mg daily    #Transaminitis  - RUQ sono with hepatic steatosis    #DVT ppx:  Lovenox

## 2023-06-07 PROCEDURE — 99232 SBSQ HOSP IP/OBS MODERATE 35: CPT

## 2023-06-07 RX ADMIN — ENOXAPARIN SODIUM 40 MILLIGRAM(S): 100 INJECTION SUBCUTANEOUS at 06:32

## 2023-06-07 RX ADMIN — LOSARTAN POTASSIUM 50 MILLIGRAM(S): 100 TABLET, FILM COATED ORAL at 06:33

## 2023-06-07 RX ADMIN — LIDOCAINE 1 PATCH: 4 CREAM TOPICAL at 19:16

## 2023-06-07 RX ADMIN — Medication 650 MILLIGRAM(S): at 22:10

## 2023-06-07 RX ADMIN — Medication 650 MILLIGRAM(S): at 21:05

## 2023-06-07 RX ADMIN — Medication 1 MILLIGRAM(S): at 12:17

## 2023-06-07 RX ADMIN — LIDOCAINE 1 PATCH: 4 CREAM TOPICAL at 15:12

## 2023-06-07 RX ADMIN — Medication 6 MILLIGRAM(S): at 21:06

## 2023-06-07 RX ADMIN — Medication 100 MILLIGRAM(S): at 12:17

## 2023-06-07 NOTE — CHART NOTE - NSCHARTNOTEFT_GEN_A_CORE
Nutrition Follow Up Note  Hospital Course   (Per Electronic Medical Record)    Source:  Patient [X]  Medical Record [X]      Diet:   Regular Diet (IDDSI Level 7) w/ Thin Liquids (IDDSI Level 0)  Tolerates Diet Consistency Well  No Chewing/Swallowing Difficulties  No Recent Nausea, Vomiting, Diarrhea or Constipation (as Per Patient)  Consumes % of Meals (as Per Documentation) - States Good PO Intake/Appetite (Per Patient)  on Ensure Plus High Protein 8oz PO Daily (Provides 350kcal & 20grams of Protein)  Patient Takes Nutrition Supplement   Obtained Food Preferences from Patient    Enteral/Parenteral Nutrition: Not Applicable    Current Weight: 136.2lb on 6/4  Obtain New Weight to Confirm  Obtain Weights Weekly     Pertinent Medications: MEDICATIONS  (STANDING):  enoxaparin Injectable 40 milliGRAM(s) SubCutaneous every 24 hours  folic acid 1 milliGRAM(s) Oral daily  lidocaine   4% Patch 1 Patch Transdermal daily  losartan 50 milliGRAM(s) Oral daily  melatonin 6 milliGRAM(s) Oral at bedtime  thiamine 100 milliGRAM(s) Oral daily    MEDICATIONS  (PRN):  acetaminophen     Tablet .. 650 milliGRAM(s) Oral every 12 hours PRN Moderate Pain (4 - 6)  loperamide 2 milliGRAM(s) Oral every 6 hours PRN Diarrhea    Pertinent Labs:  06-05 Na133 mmol/L<L> Glu 139 mg/dL<H> K+ 4.5 mmol/L Cr  0.55 mg/dL BUN 10 mg/dL 06-01 Phos 4.1 mg/dL 06-05 Alb 3.0 g/dL<L>    Skin: No Pressure Ulcers     Edema: None Noted (as Per Documentation)     Last Bowel Movement: on 6/6    Estimated Needs:   [X] No Change Since Previous Assessment    Previous Nutrition Diagnosis:   Moderate Malnutrition     Nutrition Diagnosis is [X] Ongoing - Continues on Nutrition Supplement & Patient Takes Nutrition Supplement  Well    New Nutrition Diagnosis: [X] Not Applicable    Interventions:   1. Recommend Continue Nutrition Plan of Care     Monitoring & Evaluation:   [X] Weights   [X] PO Intake   [X] Skin Integrity   [X] Follow Up (Per Protocol)  [X] Tolerance to Diet Prescription   [X] Other: Labs     Registered Dietitian/Nutritionist Remains Available.  Tre Beltran RDN    Phone# [phone]
[facility] Interdiscplinary Plan of Care    REHABILITATION DIAGNOSIS:  Other malaise          COMORBIDITIES/COMPLICATING CONDITIONS IMPACTING REHABILITATION:  HEALTH ISSUES - PROBLEM Dx:  ETOH dependence  Anxiety      PAST MEDICAL & SURGICAL HISTORY:  HTN (hypertension)      Anxiety      EtOH dependence      No significant past surgical history          Based upon consideration of the patient's impairments, functional status, complicating conditions and any other contributing factors and after information garnered from the assessments of all therapy disciplines involved in treating the patient and other pertinent clinicians:    INTERDISCIPLINARY REHABILITATION INTERVENTIONS:    [ X  ] Transfer Training  [ X  ] Bed Mobility  [ X  ] Therapeutic Exercise  [ X ] Balance/Coordination Exercises  [ X ] Locomotion retraining  [ X  ] Stairs  [  X ] Functional Transfer Training  [   ] Bowel/Bladder program  [   ] Pain Management  [   ] Skin/Wound Care  [   ] Visual/Perceptual Training  [   ] Therapeutic Recreation Activities  [   ] Neuromuscular Re-education  [ X  ] Activities of Daily Living  [   ] Speech Exercise  [   ] Swallowing Exercises  [   ] Vital Stim  [   ] Dietary Supplements  [   ] Calorie Count  [   ] Cognitive Exercises  [   ] Congnitive/Linguistic Treatment  [   ] Behavior Program  [   ] Neuropsych Therapy  [ X  ] Patient/Family Counseling  [ X ] Family Training  [ X  ] Community Re-entry  [   ] Orthotic Evaluation  [   ] Prosthetic Eval/Training    MEDICAL PROGNOSIS:  good    REHAB POTENTIAL:  good  EXPECTED DAILY THERAPY:         PT:1hr       OT:1hr       ST:1hr       P&O:    EXPECTED INTENSITY OF PROGRAM:  3 hrs / Day    EXPECTED FREQUENCY OF PROGRAM: 5 Days/ Week    ESTIMATED LOS:  [  ] 5-7 Days  [  ] 7-10 Days  [  ] 10- 14 Days  [  ] 14- 18 Days  [  ] 18- 21 Days    ESTIMATED DISPOSITION:  [  ] Home   [  ] Home with Outpatient Therapies  [ x ] Home with Home Therapies  [  ] Assisted Living  [  ] Nursing Home  [  ] Long Term Acute Care    INTERDISCIPLINARY FUNCTIONAL OUTCOMES/GOALS:         Gait/Mobility:6       Transfers:6       ADLs:6       Functional Transfers:6       Medication Management:7       Communication:NA       Cognitive:7       Dysphagia:NA       Bladder7       Bowel:7     Functional Independent Measures:   7 = Independent  6 = Modified Independent  5 = Supervision  4 = Minimal Assist/ Contact Guard  3 = Moderate Assistance  2 = Maximum Assistance  1 = Total Assistance  0 = Unable to assess

## 2023-06-07 NOTE — PROGRESS NOTE ADULT - SUBJECTIVE AND OBJECTIVE BOX
HPI:  This is a 73 YO F  with PMH of HTN, anxiety, and EtOH dependence (drinks 8-10 oz vodka daily for 40+ years), admitted for gait instability and recurrent falls for past 6 months. CTH wnl, BMP notable for hyponatremia to 122 likely 2/2 "beer potomania."  Patient was placed on CIWA and IVF with continuous NS. Patient seen by PT, who recommended rolling walker. Patient not interested in stopping drinking because she likes it.  Course also c/b UTI with Kleb pneumo treated with ceftriaxone x3d.      On admission, patient noted to have symptoms c/f Wernicke-Korsakoff: lateral nystagmus (with rightward gaze), disorientation (was not aware that she had been staying in same room the entire admission), and wide-based ataxia.  Also noted to have confabulations (would describe coordinating events with nighttime nursing staff) and impaired short term memory (forgot conversations, people, etc from day prior).  Patient was given IV thiamine, B9, B12, multivitamins, and electrolytes repleted. CTH negative.    Patient was evaluated by PM&R and therapy for functional deficits, gait/ADL impairments and acute rehabilitation was recommended. Patient was medically optimized for discharge to [facility] IRU on 6/1/23.      NAD overnight, no sz, no HA, no weakness. balance is impaired. Alert and awake at baseline. In good spirits.  patch for right knee pain-chronic.   No other new ROS.  Has been tolerating rehabilitation program. Psychiatry evaluation offered -as per family suggestions-pt refused.        VITALS  Vital Signs Last 24 Hrs  T(C): 36.6 (07 Jun 2023 08:14), Max: 37.6 (06 Jun 2023 21:46)  T(F): 97.8 (07 Jun 2023 08:14), Max: 99.6 (06 Jun 2023 21:46)  HR: 82 (07 Jun 2023 08:14) (80 - 82)  BP: 102/63 (07 Jun 2023 08:14) (102/63 - 125/80)  BP(mean): --  RR: 16 (07 Jun 2023 08:14) (16 - 16)  SpO2: 96% (07 Jun 2023 08:14) (95% - 96%)    Parameters below as of 07 Jun 2023 08:14  Patient On (Oxygen Delivery Method): room air        CURRENT MEDICATIONS  MEDICATIONS  (STANDING):  enoxaparin Injectable 40 milliGRAM(s) SubCutaneous every 24 hours  folic acid 1 milliGRAM(s) Oral daily  lidocaine   4% Patch 1 Patch Transdermal daily  losartan 50 milliGRAM(s) Oral daily  melatonin 6 milliGRAM(s) Oral at bedtime  thiamine 100 milliGRAM(s) Oral daily    MEDICATIONS  (PRN):  acetaminophen     Tablet .. 650 milliGRAM(s) Oral every 12 hours PRN Moderate Pain (4 - 6)  loperamide 2 milliGRAM(s) Oral every 6 hours PRN Diarrhea      Physical Exam: Constitutional - NAD  HEENT - NCAT, EOMI  Chest - Respirations non-distressed, lungs CTAB  Cardio - RRR, no murmur, no peripheral edema   Abdomen -  Soft, NTND  Neurologic Exam: a&o x3                                   LEFT    UE - ShAB 4/5, EF 5/5, EE 4/5, WE 5/5,  WNL                    RIGHT UE - ShAB 4/5, EF 5/5, EE 4/5, WE 5/5,  WNL                    LEFT    LE - HF 5/5, KE 5/5, DF 5/5, PF 5/5                    RIGHT LE - HF 5/5, KE 5/5, DF 5/5, PF 5/5        Sensory - Intact to LT bilateral     Coordination - Dysmetria      OculoVestibular -  + horizontal nystagmus to left  Psychiatric - Affect wnl   Skin on admission: Unremarkable    IDT - 6/6       SW - one step to enter. 3 steps inside. Lives with   SP - Regular with thin. Some cognitive disfunction.   PT - CG. 200 feet CG without a device.   OT - e/g - setup. CG for the rest of the ADL's   Goal - Supervision.   TDD - 6/9 to home.      Continue comprehensive acute rehab program consisting of 3hrs/day of OT/PT and SLP.

## 2023-06-07 NOTE — PROGRESS NOTE ADULT - ASSESSMENT
71 YO F with PMH of HTN, anxiety, and EtOH dependence who presents to [facility] with recurrent fall found to have have Wernicke-Korsakoff, hyponatremia, UTI,  and impaired short term memory. Patient now with gait Instability, ADL impairments and Functional impairments.    #Debility 2/2 alcohol use disorder  - Comprehensive Rehab Program: PT/OT/ST, 3hours daily and 5 days weekly  - PT: Focused on improving strength, endurance, coordination, balance, functional mobility, and transfers  - OT: Focused on improving strength, fine motor skills, coordination, posture and ADLs.    - ST: Diagnose and treat deficits in swallowing, cognition and communication.     #Alcohol Use Disorder  #Wernicke-Korsakoff   #Anxiety   - Folic acid 1 mg PO daily   - Thiamine   - Nutritionist consult  -refused psych eval    #HTN  - Losartan 50mg daily-monitor BPs-100s this am    #Transaminitis  - RUQ sono with hepatic steatosis. Tylenol to avoid beyond BID.     #DVT ppx  - Lovenox, SCD, TEDs    #Bowel Regimen  - On hold w/ regular BMs  - imodium 2mg Q 6 hrs PRN    #Bladder management  - Voiding independently   - BS on admission, and q8h PRN  - Monitor UO    #FEN   - Diet: DASH    #Precaution  - Fall, Aspiration, Fracture     #GOC  CODE STATUS: FULL CODE    Outpatient Follow-up (Specialty/Name of physician):    Ariana Beard  Family Medicine  [address] [place] [address]  Phone: [phone]  Fax: [phone]  Established Patient  Follow Up Time: 1 week  Follow-up [facility] Psychiat[facility]  Psychiatry  [address] [address]  Phone: [phone]  Follow Up Time: 2 weeks

## 2023-06-08 LAB
ALBUMIN SERPL ELPH-MCNC: 2.8 G/DL — LOW (ref 3.3–5)
ALP SERPL-CCNC: 114 U/L — SIGNIFICANT CHANGE UP (ref 40–120)
ALT FLD-CCNC: 102 U/L — HIGH (ref 10–45)
ANION GAP SERPL CALC-SCNC: 8 MMOL/L — SIGNIFICANT CHANGE UP (ref 5–17)
AST SERPL-CCNC: 62 U/L — HIGH (ref 10–40)
BILIRUB SERPL-MCNC: 0.4 MG/DL — SIGNIFICANT CHANGE UP (ref 0.2–1.2)
BUN SERPL-MCNC: 12 MG/DL — SIGNIFICANT CHANGE UP (ref 7–23)
CALCIUM SERPL-MCNC: 9.3 MG/DL — SIGNIFICANT CHANGE UP (ref 8.4–10.5)
CHLORIDE SERPL-SCNC: 96 MMOL/L — SIGNIFICANT CHANGE UP (ref 96–108)
CO2 SERPL-SCNC: 26 MMOL/L — SIGNIFICANT CHANGE UP (ref 22–31)
CREAT SERPL-MCNC: 0.65 MG/DL — SIGNIFICANT CHANGE UP (ref 0.5–1.3)
EGFR: 95 ML/MIN/1.73M2 — SIGNIFICANT CHANGE UP
GLUCOSE SERPL-MCNC: 102 MG/DL — HIGH (ref 70–99)
HCT VFR BLD CALC: 29.8 % — LOW (ref 34.5–45)
HGB BLD-MCNC: 10 G/DL — LOW (ref 11.5–15.5)
MCHC RBC-ENTMCNC: 33.6 GM/DL — SIGNIFICANT CHANGE UP (ref 32–36)
MCHC RBC-ENTMCNC: 34.4 PG — HIGH (ref 27–34)
MCV RBC AUTO: 102.4 FL — HIGH (ref 80–100)
NRBC # BLD: 0 /100 WBCS — SIGNIFICANT CHANGE UP (ref 0–0)
PLATELET # BLD AUTO: 385 K/UL — SIGNIFICANT CHANGE UP (ref 150–400)
POTASSIUM SERPL-MCNC: 4.3 MMOL/L — SIGNIFICANT CHANGE UP (ref 3.5–5.3)
POTASSIUM SERPL-SCNC: 4.3 MMOL/L — SIGNIFICANT CHANGE UP (ref 3.5–5.3)
PROT SERPL-MCNC: 6.4 G/DL — SIGNIFICANT CHANGE UP (ref 6–8.3)
RBC # BLD: 2.91 M/UL — LOW (ref 3.8–5.2)
RBC # FLD: 13 % — SIGNIFICANT CHANGE UP (ref 10.3–14.5)
SODIUM SERPL-SCNC: 130 MMOL/L — LOW (ref 135–145)
WBC # BLD: 7.09 K/UL — SIGNIFICANT CHANGE UP (ref 3.8–10.5)
WBC # FLD AUTO: 7.09 K/UL — SIGNIFICANT CHANGE UP (ref 3.8–10.5)

## 2023-06-08 PROCEDURE — 99232 SBSQ HOSP IP/OBS MODERATE 35: CPT

## 2023-06-08 RX ORDER — LOSARTAN POTASSIUM 100 MG/1
25 TABLET, FILM COATED ORAL DAILY
Refills: 0 | Status: DISCONTINUED | OUTPATIENT
Start: 2023-06-09 | End: 2023-06-09

## 2023-06-08 RX ADMIN — LIDOCAINE 1 PATCH: 4 CREAM TOPICAL at 20:19

## 2023-06-08 RX ADMIN — ENOXAPARIN SODIUM 40 MILLIGRAM(S): 100 INJECTION SUBCUTANEOUS at 07:14

## 2023-06-08 RX ADMIN — LIDOCAINE 1 PATCH: 4 CREAM TOPICAL at 03:00

## 2023-06-08 RX ADMIN — LOSARTAN POTASSIUM 50 MILLIGRAM(S): 100 TABLET, FILM COATED ORAL at 07:15

## 2023-06-08 RX ADMIN — Medication 1 MILLIGRAM(S): at 11:46

## 2023-06-08 RX ADMIN — LIDOCAINE 1 PATCH: 4 CREAM TOPICAL at 11:46

## 2023-06-08 RX ADMIN — Medication 100 MILLIGRAM(S): at 12:21

## 2023-06-08 RX ADMIN — Medication 6 MILLIGRAM(S): at 21:35

## 2023-06-08 NOTE — PROGRESS NOTE ADULT - SUBJECTIVE AND OBJECTIVE BOX
Patient is a 72y old  Female who presents with a chief complaint of Debility (08 Jun 2023 09:32)      Patient seen and examined at bedside. No events overnight, patient states she was finally able to sleep last night. Denies chest pain, sob, abd pain, admits to some muscle aches but improved.     ALLERGIES:  No Known Allergies    MEDICATIONS  (STANDING):  enoxaparin Injectable 40 milliGRAM(s) SubCutaneous every 24 hours  folic acid 1 milliGRAM(s) Oral daily  lidocaine   4% Patch 1 Patch Transdermal daily  melatonin 6 milliGRAM(s) Oral at bedtime  thiamine 100 milliGRAM(s) Oral daily    MEDICATIONS  (PRN):  acetaminophen     Tablet .. 650 milliGRAM(s) Oral every 12 hours PRN Moderate Pain (4 - 6)  loperamide 2 milliGRAM(s) Oral every 6 hours PRN Diarrhea    Vital Signs Last 24 Hrs  T(F): 98 (08 Jun 2023 08:28), Max: 98.7 (07 Jun 2023 21:09)  HR: 92 (08 Jun 2023 08:28) (78 - 92)  BP: 101/68 (08 Jun 2023 08:28) (94/63 - 127/72)  RR: 16 (08 Jun 2023 08:28) (16 - 16)  SpO2: 96% (08 Jun 2023 08:28) (94% - 96%)  I&O's Summary      PHYSICAL EXAM:  GENERAL: NAD, sitting at edge of bed  HEAD:  Atraumatic, Normocephalic  EYES: PEERL, conjunctiva and sclera clear  ENMT: Moist mucous membranes, Supple, No JVD  CHEST/LUNG: Clear to auscultation bilaterally, good air entry, non-labored breathing  HEART: RRR; S1/S2, No murmur  ABDOMEN: Soft, Nontender, Nondistended; Bowel sounds present  EXTREMITIES: No calf tenderness, No cyanosis, No edema  SKIN: Warm, perfused  PSYCH: Normal mood, Normal affect    LABS:                        10.0   7.09  )-----------( 385      ( 08 Jun 2023 06:06 )             29.8     06-08    130  |  96  |  12  ----------------------------<  102  4.3   |  26  |  0.65    Ca    9.3      08 Jun 2023 06:06    TPro  6.4  /  Alb  2.8  /  TBili  0.4  /  DBili  x   /  AST  62  /  ALT  102  /  AlkPhos  114  06-08                                    COVID-19 PCR: NotDetec (05-31-23 @ 16:45)      RADIOLOGY & ADDITIONAL TESTS:    Care Discussed with Consultants/Other Providers:

## 2023-06-08 NOTE — PROGRESS NOTE ADULT - ASSESSMENT
73 YO F with PMH of HTN, anxiety, and EtOH dependence who presents to [facility] with recurrent fall found to have have Wernicke-Korsakoff, hyponatremia, UTI,  and impaired short term memory. Patient now with gait Instability, ADL impairments and Functional impairments.    #Debility 2/2 alcohol use disorder  - Comprehensive Rehab Program: PT/OT/ST, 3hours daily and 5 days weekly  - PT: Focused on improving strength, endurance, coordination, balance, functional mobility, and transfers  - OT: Focused on improving strength, fine motor skills, coordination, posture and ADLs.    - ST: Diagnose and treat deficits in swallowing, cognition and communication.     #Alcohol Use Disorder  #Wernicke-Korsakoff   #Anxiety   - Folic acid 1 mg PO daily   - Thiamine   - Nutritionist consult  -refused psych eval    #HTN  - Losartan 50mg daily to 25mg-borderline BP-monitor BPs    #Transaminitis  - RUQ sono with hepatic steatosis. Tylenol to avoid beyond BID.     #DVT ppx  - Lovenox, SCD, TEDs    #Bowel Regimen  - On hold w/ regular BMs  - imodium 2mg Q 6 hrs PRN    #Bladder management  - Voiding independently   - BS on admission, and q8h PRN  - Monitor UO    #FEN   - Diet: DASH    #Precaution  - Fall, Aspiration, Fracture     #GOC  CODE STATUS: FULL CODE    Outpatient Follow-up (Specialty/Name of physician):    Ariana Beard  Family Medicine  [address] [place] [address]  Phone: [phone]  Fax: [phone]  Established Patient  Follow Up Time: 1 week  Follow-up [facility] Psychiatry  Psychiatry  [address] [place] [address]  Phone: [phone]  Follow Up Time: 2 weeks

## 2023-06-08 NOTE — PROGRESS NOTE ADULT - SUBJECTIVE AND OBJECTIVE BOX
HPI:  This is a 73 YO F  with PMH of HTN, anxiety, and EtOH dependence (drinks 8-10 oz vodka daily for 40+ years), admitted for gait instability and recurrent falls for past 6 months. CTH wnl, BMP notable for hyponatremia to 122 likely 2/2 "beer potomania."  Patient was placed on CIWA and IVF with continuous NS. Patient seen by PT, who recommended rolling walker. Patient not interested in stopping drinking because she likes it.  Course also c/b UTI with Kleb pneumo treated with ceftriaxone x3d.      On admission, patient noted to have symptoms c/f Wernicke-Korsakoff: lateral nystagmus (with rightward gaze), disorientation (was not aware that she had been staying in same room the entire admission), and wide-based ataxia.  Also noted to have confabulations (would describe coordinating events with nighttime nursing staff) and impaired short term memory (forgot conversations, people, etc from day prior).  Patient was given IV thiamine, B9, B12, multivitamins, and electrolytes repleted. CTH negative.    Patient was evaluated by PM&R and therapy for functional deficits, gait/ADL impairments and acute rehabilitation was recommended. Patient was medically optimized for discharge to [facility] IRU on 6/1/23.       NAD overnight, no sz, no HA, no weakness. balance is impaired. Alert and awake at baseline. In good spirits.  patch for right knee pain-chronic.   No other new ROS.  Has been tolerating rehabilitation program. Psychiatry evaluation offered -as per family suggestions-pt refused.   Cozaar adjusted for low BP-avoid hypotension/unsteady gait.    VITALS  Vital Signs Last 24 Hrs  T(C): 36.7 (08 Jun 2023 08:28), Max: 37.1 (07 Jun 2023 21:09)  T(F): 98 (08 Jun 2023 08:28), Max: 98.7 (07 Jun 2023 21:09)  HR: 92 (08 Jun 2023 08:28) (78 - 92)  BP: 101/68 (08 Jun 2023 08:28) (94/63 - 127/72)  BP(mean): --  RR: 16 (08 Jun 2023 08:28) (16 - 16)  SpO2: 96% (08 Jun 2023 08:28) (94% - 96%)    Parameters below as of 08 Jun 2023 08:28  Patient On (Oxygen Delivery Method): room air       RECENT LABS                        10.0   7.09  )-----------( 385      ( 08 Jun 2023 06:06 )             29.8     06-08    130<L>  |  96  |  12  ----------------------------<  102<H>  4.3   |  26  |  0.65    Ca    9.3      08 Jun 2023 06:06    TPro  6.4  /  Alb  2.8<L>  /  TBili  0.4  /  DBili  x   /  AST  62<H>  /  ALT  102<H>  /  AlkPhos  114  06-08      LIVER FUNCTIONS - ( 08 Jun 2023 06:06 )  Alb: 2.8 g/dL / Pro: 6.4 g/dL / ALK PHOS: 114 U/L / ALT: 102 U/L / AST: 62 U/L / GGT: x             CURRENT MEDICATIONS  MEDICATIONS  (STANDING):  enoxaparin Injectable 40 milliGRAM(s) SubCutaneous every 24 hours  folic acid 1 milliGRAM(s) Oral daily  lidocaine   4% Patch 1 Patch Transdermal daily  melatonin 6 milliGRAM(s) Oral at bedtime  thiamine 100 milliGRAM(s) Oral daily    MEDICATIONS  (PRN):  acetaminophen     Tablet .. 650 milliGRAM(s) Oral every 12 hours PRN Moderate Pain (4 - 6)  loperamide 2 milliGRAM(s) Oral every 6 hours PRN Diarrhea      Physical Exam: Constitutional - NAD  HEENT - NCAT, EOMI  Chest - Respirations non-distressed, lungs CTAB  Cardio - RRR, no murmur, no peripheral edema   Abdomen -  Soft, NTND  Neurologic Exam: a&o x3                                   LEFT    UE - ShAB 4/5, EF 5/5, EE 4/5, WE 5/5,  WNL                    RIGHT UE - ShAB 4/5, EF 5/5, EE 4/5, WE 5/5,  WNL                    LEFT    LE - HF 5/5, KE 5/5, DF 5/5, PF 5/5                    RIGHT LE - HF 5/5, KE 5/5, DF 5/5, PF 5/5        Sensory - Intact to LT bilateral     Coordination - Dysmetria      OculoVestibular -  + horizontal nystagmus to left  Psychiatric - Affect wnl   Skin on admission: Unremarkable    IDT - 6/6       SW - one step to enter. 3 steps inside. Lives with   SP - Regular with thin. Some cognitive disfunction.   PT - CG. 200 feet CG without a device.   OT - e/g - setup. CG for the rest of the ADL's   Goal - Supervision.   TDD - 6/9 to home.    Continue comprehensive acute rehab program consisting of 3hrs/day of OT/PT and SLP.

## 2023-06-08 NOTE — PROGRESS NOTE ADULT - ASSESSMENT
71 YO F with PMH of HTN, anxiety, and EtOH dependence who presents to [facility] with recurrent fall found to have have Wernicke-Korsakoff, hyponatremia, UTI,  and impaired short term memory. Patient now with gait Instability, ADL impairments and Functional impairments.    #Debility 2/2 alcohol use disorder  #Alcohol Use Disorder  #Wernicke-Korsakoff   #Anxiety   - Folic acid 1 mg PO daily   - Thiamine     #Macrocytic anemia  - Will monitor Hg.Hct, transfuse if Hg <7  - Noted iron panel, B12 and folate WNL    #Hyponatremia  - Noted, monitor for now and encourage PO intake    #HTN  - Losartan decreased to 25mg daily    #Transaminitis  - RUQ sono with hepatic steatosis    #DVT ppx:  Lovenox

## 2023-06-09 ENCOUNTER — TRANSCRIPTION ENCOUNTER (OUTPATIENT)
Age: 73
End: 2023-06-09

## 2023-06-09 VITALS
DIASTOLIC BLOOD PRESSURE: 75 MMHG | RESPIRATION RATE: 16 BRPM | TEMPERATURE: 99 F | SYSTOLIC BLOOD PRESSURE: 120 MMHG | HEART RATE: 80 BPM | OXYGEN SATURATION: 97 %

## 2023-06-09 PROCEDURE — 99232 SBSQ HOSP IP/OBS MODERATE 35: CPT

## 2023-06-09 PROCEDURE — 99238 HOSP IP/OBS DSCHRG MGMT 30/<: CPT | Mod: GC

## 2023-06-09 RX ORDER — IRBESARTAN 75 MG/1
1 TABLET ORAL
Refills: 0 | DISCHARGE

## 2023-06-09 RX ORDER — THIAMINE MONONITRATE (VIT B1) 100 MG
1 TABLET ORAL
Qty: 0 | Refills: 0 | DISCHARGE
Start: 2023-06-09

## 2023-06-09 RX ORDER — FOLIC ACID 0.8 MG
1 TABLET ORAL
Qty: 0 | Refills: 0 | DISCHARGE
Start: 2023-06-09

## 2023-06-09 RX ORDER — LANOLIN ALCOHOL/MO/W.PET/CERES
2 CREAM (GRAM) TOPICAL
Qty: 0 | Refills: 0 | DISCHARGE
Start: 2023-06-09

## 2023-06-09 RX ORDER — LOSARTAN POTASSIUM 100 MG/1
1 TABLET, FILM COATED ORAL
Qty: 30 | Refills: 0
Start: 2023-06-09 | End: 2023-07-08

## 2023-06-09 RX ADMIN — ENOXAPARIN SODIUM 40 MILLIGRAM(S): 100 INJECTION SUBCUTANEOUS at 06:05

## 2023-06-09 RX ADMIN — Medication 100 MILLIGRAM(S): at 11:42

## 2023-06-09 RX ADMIN — LIDOCAINE 1 PATCH: 4 CREAM TOPICAL at 00:08

## 2023-06-09 RX ADMIN — Medication 1 MILLIGRAM(S): at 11:42

## 2023-06-09 RX ADMIN — LIDOCAINE 1 PATCH: 4 CREAM TOPICAL at 11:44

## 2023-06-09 NOTE — PROGRESS NOTE ADULT - SUBJECTIVE AND OBJECTIVE BOX
Patient is a 72y old  Female who presents with a chief complaint of Debility (09 Jun 2023 08:21)    Patient seen and examined at bedside. No events overnight. Denies chest pain, sob, abd pain, admits to some muscle aches but improved, also admits to R knee pain with PT, denies numbness or tingling in RLE    ALLERGIES:  No Known Allergies    MEDICATIONS  (STANDING):  enoxaparin Injectable 40 milliGRAM(s) SubCutaneous every 24 hours  folic acid 1 milliGRAM(s) Oral daily  lidocaine   4% Patch 1 Patch Transdermal daily  losartan 25 milliGRAM(s) Oral daily  melatonin 6 milliGRAM(s) Oral at bedtime  thiamine 100 milliGRAM(s) Oral daily    MEDICATIONS  (PRN):  acetaminophen     Tablet .. 650 milliGRAM(s) Oral every 12 hours PRN Moderate Pain (4 - 6)  loperamide 2 milliGRAM(s) Oral every 6 hours PRN Diarrhea    Vital Signs Last 24 Hrs  T(F): 98.6 (09 Jun 2023 08:11), Max: 98.6 (08 Jun 2023 20:16)  HR: 80 (09 Jun 2023 08:11) (61 - 80)  BP: 120/75 (09 Jun 2023 08:11) (111/63 - 120/75)  RR: 16 (09 Jun 2023 08:11) (16 - 16)  SpO2: 97% (09 Jun 2023 08:11) (96% - 97%)  I&O's Summary    PHYSICAL EXAM:  GENERAL: NAD, sitting at edge of bed  HEAD:  Atraumatic, Normocephalic  EYES: PEERL, conjunctiva and sclera clear  ENMT: Moist mucous membranes, Supple, No JVD  CHEST/LUNG: Clear to auscultation bilaterally, good air entry, non-labored breathing  HEART: RRR; S1/S2, No murmur  ABDOMEN: Soft, Nontender, Nondistended; Bowel sounds present  EXTREMITIES: No calf tenderness, No cyanosis, No edema  SKIN: Warm, perfused  PSYCH: Normal mood, Normal affect      LABS:                        10.0   7.09  )-----------( 385      ( 08 Jun 2023 06:06 )             29.8     06-08    130  |  96  |  12  ----------------------------<  102  4.3   |  26  |  0.65    Ca    9.3      08 Jun 2023 06:06    TPro  6.4  /  Alb  2.8  /  TBili  0.4  /  DBili  x   /  AST  62  /  ALT  102  /  AlkPhos  114  06-08                                    COVID-19 PCR: NotDetec (05-31-23 @ 16:45)      RADIOLOGY & ADDITIONAL TESTS:    Care Discussed with Consultants/Other Providers:

## 2023-06-09 NOTE — PROGRESS NOTE ADULT - NUTRITIONAL ASSESSMENT
This patient has been assessed with a concern for Malnutrition and has been determined to have a diagnosis/diagnoses of Moderate protein-calorie malnutrition.    This patient is being managed with:   Diet Regular-  Supplement Feeding Modality:  Oral  Ensure Plus High Protein Cans or Servings Per Day:  1       Frequency:  Daily  Entered: Jun 2 2023 12:51PM  

## 2023-06-09 NOTE — PROGRESS NOTE ADULT - PROVIDER SPECIALTY LIST ADULT
Hospitalist
Rehab Medicine
Physiatry
Physiatry
Rehab Medicine
Hospitalist
Physiatry

## 2023-06-09 NOTE — DISCHARGE NOTE PROVIDER - NSDCCPCAREPLAN_GEN_ALL_CORE_FT
PRINCIPAL DISCHARGE DIAGNOSIS  Diagnosis: ETOH abuse  Assessment and Plan of Treatment: Follow up PCP, avoid alcohol, continue supplements.      SECONDARY DISCHARGE DIAGNOSES  Diagnosis: HTN (hypertension)  Assessment and Plan of Treatment: Cozaar at 25mg, check BP daily. Follow up PCP.     PRINCIPAL DISCHARGE DIAGNOSIS  Diagnosis: ETOH abuse  Assessment and Plan of Treatment: Follow up PCP, avoid alcohol, continue supplements. Follow up Behavioral health.      SECONDARY DISCHARGE DIAGNOSES  Diagnosis: HTN (hypertension)  Assessment and Plan of Treatment: Cozaar at 25mg, check BP daily. Follow up PCP.     PRINCIPAL DISCHARGE DIAGNOSIS  Diagnosis: ETOH abuse  Assessment and Plan of Treatment: Follow up PCP, avoid alcohol, continue supplements. Follow up Behavioral health.      SECONDARY DISCHARGE DIAGNOSES  Diagnosis: HTN (hypertension)  Assessment and Plan of Treatment: Cozaar at 25mg, check BP daily. Follow up PCP.    Diagnosis: Hyponatremia  Assessment and Plan of Treatment: follow sodium as OPD with PMD

## 2023-06-09 NOTE — DISCHARGE NOTE PROVIDER - CARE PROVIDER_API CALL
Ariana Beard  [facility] Hospital Medicine  [address] [place] [address]  Phone: [phone]  Fax: [phone]  Follow Up Time:

## 2023-06-09 NOTE — PROGRESS NOTE ADULT - SUBJECTIVE AND OBJECTIVE BOX
HPI:  This is a 71 YO F  with PMH of HTN, anxiety, and EtOH dependence (drinks 8-10 oz vodka daily for 40+ years), admitted for gait instability and recurrent falls for past 6 months. CTH wnl, BMP notable for hyponatremia to 122 likely 2/2 "beer potomania."  Patient was placed on CIWA and IVF with continuous NS. Patient seen by PT, who recommended rolling walker. Patient not interested in stopping drinking because she likes it.  Course also c/b UTI with Kleb pneumo treated with ceftriaxone x3d.      On admission, patient noted to have symptoms c/f Wernicke-Korsakoff: lateral nystagmus (with rightward gaze), disorientation (was not aware that she had been staying in same room the entire admission), and wide-based ataxia.  Also noted to have confabulations (would describe coordinating events with nighttime nursing staff) and impaired short term memory (forgot conversations, people, etc from day prior).  Patient was given IV thiamine, B9, B12, multivitamins, and electrolytes repleted. CTH negative.    Patient was evaluated by PM&R and therapy for functional deficits, gait/ADL impairments and acute rehabilitation was recommended. Patient was medically optimized for discharge to [facility] IRU on 6/1/23.     Subjective/ ROS:  Seen and evaluated bedside   ready for DC- OPD Mental health services to be arranged by [facility]  NAD overnight, no Sz, no HA, no weakness. balance is impaired   Alert and awake at baseline. In good spirits.  patch for right knee pain-chronic.   No other new ROS  Has been tolerating rehabilitation program  Cozaar lowered for low BP-avoid hypotension/unsteady gait.            MEDICATIONS  (STANDING):  enoxaparin Injectable 40 milliGRAM(s) SubCutaneous every 24 hours  folic acid 1 milliGRAM(s) Oral daily  lidocaine   4% Patch 1 Patch Transdermal daily  losartan 25 milliGRAM(s) Oral daily  melatonin 6 milliGRAM(s) Oral at bedtime  thiamine 100 milliGRAM(s) Oral daily    MEDICATIONS  (PRN):  acetaminophen     Tablet .. 650 milliGRAM(s) Oral every 12 hours PRN Moderate Pain (4 - 6)  loperamide 2 milliGRAM(s) Oral every 6 hours PRN Diarrhea                            10.0   7.09  )-----------( 385      ( 08 Jun 2023 06:06 )             29.8     06-08    130<L>  |  96  |  12  ----------------------------<  102<H>  4.3   |  26  |  0.65    Ca    9.3      08 Jun 2023 06:06    TPro  6.4  /  Alb  2.8<L>  /  TBili  0.4  /  DBili  x   /  AST  62<H>  /  ALT  102<H>  /  AlkPhos  114  06-08        CAPILLARY BLOOD GLUCOSE          Vital Signs Last 24 Hrs  T(C): 37 (09 Jun 2023 08:11), Max: 37 (08 Jun 2023 20:16)  T(F): 98.6 (09 Jun 2023 08:11), Max: 98.6 (08 Jun 2023 20:16)  HR: 80 (09 Jun 2023 08:11) (61 - 80)  BP: 120/75 (09 Jun 2023 08:11) (111/63 - 120/75)  BP(mean): --  RR: 16 (09 Jun 2023 08:11) (16 - 16)  SpO2: 97% (09 Jun 2023 08:11) (96% - 97%)    Parameters below as of 09 Jun 2023 08:11  Patient On (Oxygen Delivery Method): room air      Physical Exam: Constitutional - NAD  HEENT - NCAT, EOMI  Chest - Respirations non-distressed, lungs CTAB  Cardio - RRR, no murmur, no peripheral edema   Abdomen -  Soft, NTND  Neurologic Exam: a&o x3                                   LEFT    UE - ShAB 5/5, EF 5/5, EE 5/5, WE 5/5,  WNL                    RIGHT UE - ShAB 5/5, EF 5/5, EE 5/5, WE 5/5,  WNL                    LEFT    LE - HF 5/5, KE 5/5, DF 5/5, PF 5/5                    RIGHT LE - HF 5/5, KE 5/5, DF 5/5, PF 5/5        Sensory - Intact to LT bilateral     Coordination - Dysmetria      OculoVestibular -nystagmus resolved  Psychiatric - Affect wnl   Skin on admission: Unremarkable    IDT - 6/6       SW - one step to enter. 3 steps inside. Lives with   SP - Regular with thin. Some cognitive disfunction.   PT - CG. 200 feet CG without a device.   OT - e/g - setup. CG for the rest of the ADL's   Goal - Supervision.   TDD - 6/9 to home.    Continue comprehensive acute rehab program consisting of 3hrs/day of OT/PT and SLP.

## 2023-06-09 NOTE — PROGRESS NOTE ADULT - ASSESSMENT
73 YO F with PMH of HTN, anxiety, and EtOH dependence who presents to [facility] with recurrent fall found to have have Wernicke-Korsakoff, hyponatremia, UTI,  and impaired short term memory. Patient now with gait Instability, ADL impairments and Functional impairments.    #Debility 2/2 alcohol use disorder  #Alcohol Use Disorder  #Wernicke-Korsakoff   #Anxiety   - Folic acid 1 mg PO daily   - Thiamine     #Macrocytic anemia  - Will monitor Hg.Hct, transfuse if Hg <7  - Noted iron panel, B12 and folate WNL    #Hyponatremia  - Noted, monitor for now and encourage PO intake    #HTN  - Losartan decreased to 25mg daily    #Transaminitis  - RUQ sono with hepatic steatosis    #DVT ppx:  Lovenox

## 2023-06-09 NOTE — DISCHARGE NOTE PROVIDER - HOSPITAL COURSE
73 YO F  with PMH of HTN, anxiety, and EtOH dependence (drinks 8-10 oz vodka daily for 40+ years), admitted for gait instability and recurrent falls for past 6 months. CTH wnl, BMP notable for hyponatremia to 122 likely 2/2 "beer potomania."  Patient was placed on CIWA and IVF with continuous NS. Patient seen by PT, who recommended rolling walker. Patient not interested in stopping drinking because she likes it.  Course also c/b UTI with Kleb pneumo treated with ceftriaxone x3d.      On admission, patient noted to have symptoms c/f Wernicke-Korsakoff: lateral nystagmus (with rightward gaze), disorientation (was not aware that she had been staying in same room the entire admission), and wide-based ataxia.  Also noted to have confabulations (would describe coordinating events with nighttime nursing staff) and impaired short term memory (forgot conversations, people, etc from day prior).  Patient was given IV thiamine, B9, B12, multivitamins, and electrolytes repleted. CTH negative.    Patient was evaluated by PM&R and therapy for functional deficits, gait/ADL impairments and acute rehabilitation was recommended. Patient was medically optimized for discharge to [facility] IRU on 6/1/23.       NAD overnight, no sz, no HA, no weakness. balance is impaired. Alert and awake at baseline. In good spirits.  patch for right knee pain-chronic.   No other new ROS.  Has been tolerating rehabilitation program. Psychiatry evaluation offered -as per family suggestions-pt refused.   Cozaar adjusted for low BP-avoid hypotension/unsteady gait.

## 2023-06-09 NOTE — PROGRESS NOTE ADULT - ASSESSMENT
73 YO F with PMH of HTN, anxiety, and EtOH dependence who presents to [facility] with recurrent fall found to have Wernicke-Korsakoff, hyponatremia, UTI,  and impaired short term memory. Patient now with gait Instability, ADL impairments and Functional impairments.    #Debility 2/2 alcohol use disorder  - Comprehensive Rehab Program: PT/OT/ST, 3hours daily and 5 days weekly  - PT: Focused on improving strength, endurance, coordination, balance, functional mobility, and transfers  - OT: Focused on improving strength, fine motor skills, coordination, posture and ADLs.    DC to home with OPD PT  FU PMD 7-10 days    #Alcohol Use Disorder  #Wernicke-Korsakoff   #Anxiety   - Folic acid 1 mg PO daily   - Thiamine   - Nutritionist consult  -refused psych eval  OPD mental health services as arranged by [facility]    #HTN  - Losartan 50mg daily to 25mg-borderline BP-monitor BPs    #Transaminitis  - RUQ sono with hepatic steatosis. Tylenol to avoid beyond BID.     #DVT ppx  - Lovenox, SCD, TEDs- no need on DC    #Bowel Regimen  - On hold w/ regular BMs  - imodium 2mg Q 6 hrs PRN    #Bladder management  - Voiding independently   - BS on admission, and q8h PRN  - Monitor UO    #FEN   - Diet: DASH    #Precaution  - Fall, Aspiration, Fracture     #GOC  CODE STATUS: FULL CODE    Outpatient Follow-up (Specialty/Name of physician):    Ariana Beard  Family Medicine  [address] [place] [address]  Phone: [phone]  Fax: [phone]  Established Patient  Follow Up Time: 1 week  Follow-up Clinics	    [facility] Psychiatry  Psychiatry  [address] [place] [address]  Phone: [phone]  Follow Up Time: 2 weeks- referred to [facility] in [place]    73 YO F with PMH of HTN, anxiety, and EtOH dependence who presents to [facility] with recurrent fall found to have Wernicke-Korsakoff, hyponatremia, UTI,  and impaired short term memory. Patient now with gait Instability, ADL impairments and Functional impairments.    #Debility 2/2 alcohol use disorder  - Comprehensive Rehab Program: PT/OT/ST, 3hours daily and 5 days weekly  - PT: Focused on improving strength, endurance, coordination, balance, functional mobility, and transfers  - OT: Focused on improving strength, fine motor skills, coordination, posture and ADLs.    DC to home with OPD PT  FU PMD 7-10 days  FU Mental health in [place] - appt made by soc service    #Alcohol Use Disorder  #Wernicke-Korsakoff   #Anxiety   - Folic acid 1 mg PO daily   - Thiamine   - Nutritionist consult  -refused psych eval  OPD mental health services as arranged by [facility]    #HTN  - Losartan 50mg daily to 25mg-borderline BP-monitor BPs    #Transaminitis  - RUQ sono with hepatic steatosis. Tylenol to avoid beyond BID.     Hyponatremia-   Na 130 on 6/8  encourage salt  FU BMP as OPD with PMD- has appt for Saturday    #DVT ppx  - Lovenox, SCD, TEDs- no need on DC    #Bowel Regimen  - On hold w/ regular BMs  - imodium 2mg Q 6 hrs PRN    #Bladder management  - Voiding independently   - BS on admission, and q8h PRN  - Monitor UO    #FEN   - Diet: DASH    #Precaution  - Fall, Aspiration, Fracture     #GOC  CODE STATUS: FULL CODE    Outpatient Follow-up (Specialty/Name of physician):    Ariana Beard  Family Medicine  [address] [place] [address]  Phone: [phone]  Fax: [phone]  Established Patient  Follow Up Time: 1 week  Follow-up Clinics	    [facility] Psychiatry  Psychiatry  [address] [place] [address]  Phone: [phone]  Follow Up Time: 2 weeks- referred to [facility] in [place]

## 2023-06-09 NOTE — DISCHARGE NOTE PROVIDER - NSDCMRMEDTOKEN_GEN_ALL_CORE_FT
folic acid 1 mg oral tablet: 1 tab(s) orally once a day  melatonin 3 mg oral tablet: 2 tab(s) orally once a day (at bedtime)  thiamine 100 mg oral tablet: 1 tab(s) orally once a day   folic acid 1 mg oral tablet: 1 tab(s) orally once a day  losartan 25 mg oral tablet: 1 tab(s) orally once a day  melatonin 3 mg oral tablet: 2 tab(s) orally once a day (at bedtime)  PT-eval and treat, impaired balance hx ETOH, 2h per week x 2 months: PT  thiamine 100 mg oral tablet: 1 tab(s) orally once a day

## 2023-06-09 NOTE — DISCHARGE NOTE PROVIDER - DETAILS OF MALNUTRITION DIAGNOSIS/DIAGNOSES
This patient has been assessed with a concern for Malnutrition and was treated during this hospitalization for the following Nutrition diagnosis/diagnoses:     -  06/02/2023: Moderate protein-calorie malnutrition

## 2023-06-09 NOTE — DISCHARGE NOTE NURSING/CASE MANAGEMENT/SOCIAL WORK - PATIENT PORTAL LINK FT
You can access the FollowMyHealth Patient Portal offered by [facility] by registering at the following website: [email or web address]. By joining [employer] FollowMyHealth portal, you will also be able to view your health information using other applications (apps) compatible with our system.

## 2023-06-09 NOTE — DISCHARGE NOTE PROVIDER - NSDCFUADDAPPT_GEN_ALL_CORE_FT
Family health referral to mental health Family health referral to [facility]  [address], [address]. [place], [place], [address]. [phone]

## 2023-06-09 NOTE — DISCHARGE NOTE NURSING/CASE MANAGEMENT/SOCIAL WORK - NSDCPEFALRISK_GEN_ALL_CORE
For information on Fall & Injury Prevention, visit: [email or web address] OR  [email or web address] OR  https://www.cdc.gov/steadi/patient.html

## 2023-08-02 PROCEDURE — 85025 COMPLETE CBC W/AUTO DIFF WBC: CPT

## 2023-08-02 PROCEDURE — 92507 TX SP LANG VOICE COMM INDIV: CPT

## 2023-08-02 PROCEDURE — 97112 NEUROMUSCULAR REEDUCATION: CPT

## 2023-08-02 PROCEDURE — 85027 COMPLETE CBC AUTOMATED: CPT

## 2023-08-02 PROCEDURE — 97110 THERAPEUTIC EXERCISES: CPT

## 2023-08-02 PROCEDURE — 92610 EVALUATE SWALLOWING FUNCTION: CPT

## 2023-08-02 PROCEDURE — 97530 THERAPEUTIC ACTIVITIES: CPT

## 2023-08-02 PROCEDURE — 97116 GAIT TRAINING THERAPY: CPT

## 2023-08-02 PROCEDURE — 97163 PT EVAL HIGH COMPLEX 45 MIN: CPT

## 2023-08-02 PROCEDURE — 80053 COMPREHEN METABOLIC PANEL: CPT

## 2023-08-02 PROCEDURE — 97535 SELF CARE MNGMENT TRAINING: CPT

## 2023-08-02 PROCEDURE — 36415 COLL VENOUS BLD VENIPUNCTURE: CPT

## 2023-08-02 PROCEDURE — 92523 SPEECH SOUND LANG COMPREHEN: CPT

## 2023-08-02 PROCEDURE — 86803 HEPATITIS C AB TEST: CPT

## 2023-08-02 PROCEDURE — 97167 OT EVAL HIGH COMPLEX 60 MIN: CPT

## 2023-10-11 ENCOUNTER — APPOINTMENT (OUTPATIENT)
Dept: OTOLARYNGOLOGY | Facility: CLINIC | Age: 73
End: 2023-10-11
Payer: MEDICARE

## 2023-10-11 VITALS
HEART RATE: 89 BPM | WEIGHT: 140 LBS | BODY MASS INDEX: 25.76 KG/M2 | HEIGHT: 62 IN | SYSTOLIC BLOOD PRESSURE: 148 MMHG | DIASTOLIC BLOOD PRESSURE: 95 MMHG

## 2023-10-11 DIAGNOSIS — H90.3 SENSORINEURAL HEARING LOSS, BILATERAL: ICD-10-CM

## 2023-10-11 PROCEDURE — 92557 COMPREHENSIVE HEARING TEST: CPT

## 2023-10-11 PROCEDURE — 92567 TYMPANOMETRY: CPT

## 2023-10-11 PROCEDURE — 99213 OFFICE O/P EST LOW 20 MIN: CPT

## 2023-10-11 RX ORDER — ASPIRIN 81 MG
81 TABLET, DELAYED RELEASE (ENTERIC COATED) ORAL
Refills: 0 | Status: ACTIVE | COMMUNITY

## 2023-10-11 RX ORDER — ATORVASTATIN CALCIUM 80 MG/1
TABLET, FILM COATED ORAL
Refills: 0 | Status: ACTIVE | COMMUNITY

## 2023-10-12 ENCOUNTER — APPOINTMENT (OUTPATIENT)
Dept: PHARMACY | Facility: CLINIC | Age: 73
End: 2023-10-12
Payer: SELF-PAY

## 2023-10-12 PROCEDURE — V5299A: CUSTOM

## 2023-10-20 ENCOUNTER — APPOINTMENT (OUTPATIENT)
Dept: PHARMACY | Facility: CLINIC | Age: 73
End: 2023-10-20
Payer: SELF-PAY

## 2023-10-20 PROBLEM — H90.3 ASYMMETRICAL SENSORINEURAL HEARING LOSS: Status: ACTIVE | Noted: 2018-08-08

## 2023-10-20 PROCEDURE — V5010 ASSESSMENT FOR HEARING AID: CPT

## 2023-11-20 ENCOUNTER — APPOINTMENT (OUTPATIENT)
Dept: PHARMACY | Facility: CLINIC | Age: 73
End: 2023-11-20
Payer: SELF-PAY

## 2023-11-20 PROCEDURE — V5261J: CUSTOM

## 2023-12-04 ENCOUNTER — APPOINTMENT (OUTPATIENT)
Dept: PHARMACY | Facility: CLINIC | Age: 73
End: 2023-12-04

## 2024-01-22 ENCOUNTER — APPOINTMENT (OUTPATIENT)
Dept: PHARMACY | Facility: CLINIC | Age: 74
End: 2024-01-22
Payer: SELF-PAY

## 2024-01-22 PROCEDURE — V5299A: CUSTOM

## 2024-01-22 NOTE — HISTORY OF PRESENT ILLNESS
[FreeTextEntry1] : Patient is a 73 year old female who was recently seen by Dr. Colindres for hearing loss. Per Dr. Colindres' note, patient reports no tinnitus, otalgia, otorrhea, ear infections, dizziness or headaches related to hearing. Patient has worn [employer] OPN 2 MR hearing aids that are about 5 years old. Patient reports that she hasn't worn the hearing aids in about a year due to running out of batteries. 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 Patient has been given medical clearance for hearing aids. [FreeTextEntry8] : Patient seen for hearing aid check following November dispensing of hearing aid. Patient reports issues with right hearing aid and notes that she does not like streaming of phone calls. Patient arrived 45 minutes late to appointment. Reports that she recently moved to [place].

## 2024-02-05 ENCOUNTER — APPOINTMENT (OUTPATIENT)
Dept: PHARMACY | Facility: CLINIC | Age: 74
End: 2024-02-05

## 2024-03-11 ENCOUNTER — APPOINTMENT (OUTPATIENT)
Dept: PHARMACY | Facility: CLINIC | Age: 74
End: 2024-03-11
Payer: SELF-PAY

## 2024-03-11 PROCEDURE — V5299A: CUSTOM

## 2024-10-09 ENCOUNTER — APPOINTMENT (OUTPATIENT)
Dept: OTOLARYNGOLOGY | Facility: CLINIC | Age: 74
End: 2024-10-09

## 2025-07-10 NOTE — PHYSICAL THERAPY INITIAL EVALUATION ADULT - GENERAL OBSERVATIONS, REHAB EVAL
Orders:    levothyroxine 150 mcg tablet; Take 1 tablet (150 mcg total) by mouth daily in the early morning     Pt received in chair all lines intact NAD all precautions observed RN made aware, call bell left within reach, PT will continue to follow.